# Patient Record
Sex: FEMALE | Race: BLACK OR AFRICAN AMERICAN | NOT HISPANIC OR LATINO | Employment: FULL TIME | URBAN - METROPOLITAN AREA
[De-identification: names, ages, dates, MRNs, and addresses within clinical notes are randomized per-mention and may not be internally consistent; named-entity substitution may affect disease eponyms.]

---

## 2023-05-31 DIAGNOSIS — I10 ESSENTIAL (PRIMARY) HYPERTENSION: ICD-10-CM

## 2023-05-31 RX ORDER — AMLODIPINE BESYLATE 5 MG/1
TABLET ORAL
Qty: 90 TABLET | Refills: 1 | Status: SHIPPED | OUTPATIENT
Start: 2023-05-31 | End: 2023-12-12

## 2023-06-12 DIAGNOSIS — E78.5 HYPERLIPIDEMIA, UNSPECIFIED: ICD-10-CM

## 2023-06-12 RX ORDER — ATORVASTATIN CALCIUM 40 MG/1
TABLET, FILM COATED ORAL
Qty: 90 TABLET | Refills: 1 | Status: SHIPPED | OUTPATIENT
Start: 2023-06-12 | End: 2023-10-16

## 2023-06-13 DIAGNOSIS — R92.8 ABNORMAL MAMMOGRAM: Primary | ICD-10-CM

## 2023-07-06 ENCOUNTER — HOSPITAL ENCOUNTER (OUTPATIENT)
Dept: DATA CONVERSION | Facility: HOSPITAL | Age: 53
End: 2023-07-06
Attending: SURGERY
Payer: COMMERCIAL

## 2023-07-06 DIAGNOSIS — R59.0 LOCALIZED ENLARGED LYMPH NODES: ICD-10-CM

## 2023-07-17 LAB
COMPLETE PATHOLOGY REPORT: NORMAL
CONVERTED CLINICAL DIAGNOSIS-HISTORY: NORMAL
CONVERTED FINAL DIAGNOSIS: NORMAL
CONVERTED FINAL REPORT PDF LINK TO COPY AND PASTE: NORMAL
CONVERTED GROSS DESCRIPTION: NORMAL

## 2023-07-24 LAB
ALANINE AMINOTRANSFERASE (SGPT) (U/L) IN SER/PLAS: 18 U/L (ref 7–45)
ALBUMIN (G/DL) IN SER/PLAS: 4.8 G/DL (ref 3.4–5)
ALKALINE PHOSPHATASE (U/L) IN SER/PLAS: 90 U/L (ref 33–110)
ANION GAP IN SER/PLAS: 13 MMOL/L (ref 10–20)
ASPARTATE AMINOTRANSFERASE (SGOT) (U/L) IN SER/PLAS: 17 U/L (ref 9–39)
BASOPHILS (10*3/UL) IN BLOOD BY AUTOMATED COUNT: 0.04 X10E9/L (ref 0–0.1)
BASOPHILS/100 LEUKOCYTES IN BLOOD BY AUTOMATED COUNT: 0.5 % (ref 0–2)
BILIRUBIN TOTAL (MG/DL) IN SER/PLAS: 0.4 MG/DL (ref 0–1.2)
CALCIUM (MG/DL) IN SER/PLAS: 10.3 MG/DL (ref 8.6–10.6)
CARBON DIOXIDE, TOTAL (MMOL/L) IN SER/PLAS: 28 MMOL/L (ref 21–32)
CHLORIDE (MMOL/L) IN SER/PLAS: 106 MMOL/L (ref 98–107)
CHOLESTEROL (MG/DL) IN SER/PLAS: 151 MG/DL (ref 0–199)
CHOLESTEROL IN HDL (MG/DL) IN SER/PLAS: 41.1 MG/DL
CHOLESTEROL/HDL RATIO: 3.7
CREATININE (MG/DL) IN SER/PLAS: 0.57 MG/DL (ref 0.5–1.05)
EOSINOPHILS (10*3/UL) IN BLOOD BY AUTOMATED COUNT: 0.22 X10E9/L (ref 0–0.7)
EOSINOPHILS/100 LEUKOCYTES IN BLOOD BY AUTOMATED COUNT: 2.9 % (ref 0–6)
ERYTHROCYTE DISTRIBUTION WIDTH (RATIO) BY AUTOMATED COUNT: 13.9 % (ref 11.5–14.5)
ERYTHROCYTE MEAN CORPUSCULAR HEMOGLOBIN CONCENTRATION (G/DL) BY AUTOMATED: 31.4 G/DL (ref 32–36)
ERYTHROCYTE MEAN CORPUSCULAR VOLUME (FL) BY AUTOMATED COUNT: 91 FL (ref 80–100)
ERYTHROCYTES (10*6/UL) IN BLOOD BY AUTOMATED COUNT: 4.46 X10E12/L (ref 4–5.2)
ESTIMATED AVERAGE GLUCOSE FOR HBA1C: 128 MG/DL
GFR FEMALE: >90 ML/MIN/1.73M2
GLUCOSE (MG/DL) IN SER/PLAS: 98 MG/DL (ref 74–99)
HEMATOCRIT (%) IN BLOOD BY AUTOMATED COUNT: 40.7 % (ref 36–46)
HEMOGLOBIN (G/DL) IN BLOOD: 12.8 G/DL (ref 12–16)
HEMOGLOBIN A1C/HEMOGLOBIN TOTAL IN BLOOD: 6.1 %
IMMATURE GRANULOCYTES/100 LEUKOCYTES IN BLOOD BY AUTOMATED COUNT: 0.5 % (ref 0–0.9)
LDL: 84 MG/DL (ref 0–99)
LEUKOCYTES (10*3/UL) IN BLOOD BY AUTOMATED COUNT: 7.7 X10E9/L (ref 4.4–11.3)
LYMPHOCYTES (10*3/UL) IN BLOOD BY AUTOMATED COUNT: 3.42 X10E9/L (ref 1.2–4.8)
LYMPHOCYTES/100 LEUKOCYTES IN BLOOD BY AUTOMATED COUNT: 44.4 % (ref 13–44)
MONOCYTES (10*3/UL) IN BLOOD BY AUTOMATED COUNT: 0.46 X10E9/L (ref 0.1–1)
MONOCYTES/100 LEUKOCYTES IN BLOOD BY AUTOMATED COUNT: 6 % (ref 2–10)
NEUTROPHILS (10*3/UL) IN BLOOD BY AUTOMATED COUNT: 3.52 X10E9/L (ref 1.2–7.7)
NEUTROPHILS/100 LEUKOCYTES IN BLOOD BY AUTOMATED COUNT: 45.7 % (ref 40–80)
NRBC (PER 100 WBCS) BY AUTOMATED COUNT: 0 /100 WBC (ref 0–0)
PLATELETS (10*3/UL) IN BLOOD AUTOMATED COUNT: 226 X10E9/L (ref 150–450)
POTASSIUM (MMOL/L) IN SER/PLAS: 4 MMOL/L (ref 3.5–5.3)
PROTEIN TOTAL: 7.3 G/DL (ref 6.4–8.2)
SODIUM (MMOL/L) IN SER/PLAS: 143 MMOL/L (ref 136–145)
THYROTROPIN (MIU/L) IN SER/PLAS BY DETECTION LIMIT <= 0.05 MIU/L: 0.82 MIU/L (ref 0.44–3.98)
TRIGLYCERIDE (MG/DL) IN SER/PLAS: 130 MG/DL (ref 0–149)
UREA NITROGEN (MG/DL) IN SER/PLAS: 14 MG/DL (ref 6–23)
VLDL: 26 MG/DL (ref 0–40)

## 2023-07-25 NOTE — RESULT ENCOUNTER NOTE
Appointment on 7/27/2023 we will discuss with patient at this visit.    A1c increased to 6.1 from 5.3 now in prediabetes range.    Other labs normal.

## 2023-07-26 PROBLEM — Z96.651 STATUS POST RIGHT KNEE REPLACEMENT: Status: ACTIVE | Noted: 2023-07-26

## 2023-07-26 PROBLEM — M16.10 PRIMARY LOCALIZED OSTEOARTHRITIS OF HIP: Status: ACTIVE | Noted: 2023-07-26

## 2023-07-26 PROBLEM — E78.5 HYPERLIPIDEMIA LDL GOAL <130: Status: ACTIVE | Noted: 2023-07-26

## 2023-07-26 PROBLEM — M17.0 OSTEOARTHRITIS OF KNEES, BILATERAL: Status: ACTIVE | Noted: 2023-07-26

## 2023-07-26 PROBLEM — R79.89 ABNORMAL TSH: Status: ACTIVE | Noted: 2023-07-26

## 2023-07-26 PROBLEM — R10.9 ABDOMINAL PAIN: Status: ACTIVE | Noted: 2023-07-26

## 2023-07-26 PROBLEM — G56.22 LESION OF LEFT ULNAR NERVE: Status: ACTIVE | Noted: 2023-07-26

## 2023-07-26 PROBLEM — R51.9 HEADACHE: Status: ACTIVE | Noted: 2023-07-26

## 2023-07-26 PROBLEM — M17.10 PRIMARY LOCALIZED OSTEOARTHRITIS OF KNEE: Status: ACTIVE | Noted: 2023-07-26

## 2023-07-26 PROBLEM — N39.0 URINARY TRACT INFECTION: Status: ACTIVE | Noted: 2023-07-26

## 2023-07-26 PROBLEM — M25.569 KNEE PAIN: Status: ACTIVE | Noted: 2023-07-26

## 2023-07-26 PROBLEM — I10 ESSENTIAL HYPERTENSION: Status: ACTIVE | Noted: 2023-07-26

## 2023-07-26 PROBLEM — E66.9 OBESITY: Status: ACTIVE | Noted: 2023-07-26

## 2023-07-26 PROBLEM — G56.02 CARPAL TUNNEL SYNDROME OF LEFT WRIST: Status: ACTIVE | Noted: 2023-07-26

## 2023-07-26 PROBLEM — R59.0 AXILLARY ADENOPATHY: Status: ACTIVE | Noted: 2023-07-26

## 2023-07-26 PROBLEM — R20.2 INTERMITTENT TINGLING SENSATION OF LEFT HAND AND FOOT: Status: ACTIVE | Noted: 2023-07-26

## 2023-07-26 PROBLEM — R92.8 ABNORMAL MAMMOGRAM: Status: ACTIVE | Noted: 2023-07-26

## 2023-07-26 RX ORDER — LANOLIN ALCOHOL/MO/W.PET/CERES
1 CREAM (GRAM) TOPICAL DAILY
COMMUNITY
End: 2023-12-05

## 2023-07-26 RX ORDER — TRAMADOL HYDROCHLORIDE 50 MG/1
1 TABLET ORAL EVERY 8 HOURS PRN
COMMUNITY
Start: 2023-05-30 | End: 2023-12-05

## 2023-07-26 RX ORDER — ASPIRIN 81 MG/1
1 TABLET ORAL 2 TIMES DAILY
COMMUNITY
Start: 2022-10-03 | End: 2023-12-05 | Stop reason: ALTCHOICE

## 2023-07-26 RX ORDER — ETODOLAC 500 MG/1
TABLET, EXTENDED RELEASE ORAL
COMMUNITY
Start: 2022-04-29 | End: 2023-12-05 | Stop reason: ALTCHOICE

## 2023-07-26 RX ORDER — DULAGLUTIDE 3 MG/.5ML
1 INJECTION, SOLUTION SUBCUTANEOUS
COMMUNITY
Start: 2022-12-16 | End: 2023-07-27 | Stop reason: SDUPTHER

## 2023-07-26 RX ORDER — ERGOCALCIFEROL 1.25 MG/1
CAPSULE ORAL
COMMUNITY
Start: 2017-07-20 | End: 2023-12-05

## 2023-07-27 ENCOUNTER — OFFICE VISIT (OUTPATIENT)
Dept: PRIMARY CARE | Facility: CLINIC | Age: 53
End: 2023-07-27
Payer: COMMERCIAL

## 2023-07-27 VITALS
BODY MASS INDEX: 39.16 KG/M2 | DIASTOLIC BLOOD PRESSURE: 86 MMHG | RESPIRATION RATE: 18 BRPM | HEART RATE: 78 BPM | SYSTOLIC BLOOD PRESSURE: 138 MMHG | WEIGHT: 221 LBS | TEMPERATURE: 97.6 F | OXYGEN SATURATION: 99 % | HEIGHT: 63 IN

## 2023-07-27 DIAGNOSIS — E78.5 HYPERLIPIDEMIA LDL GOAL <130: ICD-10-CM

## 2023-07-27 DIAGNOSIS — R00.2 PALPITATIONS: ICD-10-CM

## 2023-07-27 DIAGNOSIS — I10 ESSENTIAL (PRIMARY) HYPERTENSION: ICD-10-CM

## 2023-07-27 DIAGNOSIS — I10 ESSENTIAL HYPERTENSION: Primary | ICD-10-CM

## 2023-07-27 DIAGNOSIS — R73.03 PREDIABETES: ICD-10-CM

## 2023-07-27 PROBLEM — N95.1 HOT FLASHES DUE TO MENOPAUSE: Status: ACTIVE | Noted: 2023-07-27

## 2023-07-27 PROCEDURE — 3008F BODY MASS INDEX DOCD: CPT | Performed by: INTERNAL MEDICINE

## 2023-07-27 PROCEDURE — 93000 ELECTROCARDIOGRAM COMPLETE: CPT | Performed by: INTERNAL MEDICINE

## 2023-07-27 PROCEDURE — 99214 OFFICE O/P EST MOD 30 MIN: CPT | Performed by: INTERNAL MEDICINE

## 2023-07-27 PROCEDURE — 3075F SYST BP GE 130 - 139MM HG: CPT | Performed by: INTERNAL MEDICINE

## 2023-07-27 PROCEDURE — 3079F DIAST BP 80-89 MM HG: CPT | Performed by: INTERNAL MEDICINE

## 2023-07-27 RX ORDER — DULAGLUTIDE 3 MG/.5ML
1 INJECTION, SOLUTION SUBCUTANEOUS
Qty: 2 ML | Refills: 3 | Status: SHIPPED | OUTPATIENT
Start: 2023-07-27 | End: 2023-12-05

## 2023-07-27 ASSESSMENT — ENCOUNTER SYMPTOMS
APPETITE CHANGE: 0
WHEEZING: 0
DYSURIA: 0
SINUS PRESSURE: 0
WEAKNESS: 0
NUMBNESS: 0
DIFFICULTY URINATING: 0
RHINORRHEA: 0
ABDOMINAL DISTENTION: 0
FREQUENCY: 0
OCCASIONAL FEELINGS OF UNSTEADINESS: 0
FEVER: 0
COUGH: 0
ABDOMINAL PAIN: 0
BLOOD IN STOOL: 0
LOSS OF SENSATION IN FEET: 0
BACK PAIN: 0
NECK STIFFNESS: 0
DIZZINESS: 0
DIARRHEA: 0
SHORTNESS OF BREATH: 0
ARTHRALGIAS: 0
JOINT SWELLING: 0
NECK PAIN: 0
PALPITATIONS: 0
CONSTIPATION: 0
VOMITING: 0
MYALGIAS: 0
FATIGUE: 0
HEMATURIA: 0
DIAPHORESIS: 0
DEPRESSION: 0
NAUSEA: 0
SORE THROAT: 0
LIGHT-HEADEDNESS: 0
CHILLS: 0
HEADACHES: 0

## 2023-07-27 ASSESSMENT — PATIENT HEALTH QUESTIONNAIRE - PHQ9
SUM OF ALL RESPONSES TO PHQ9 QUESTIONS 1 AND 2: 0
2. FEELING DOWN, DEPRESSED OR HOPELESS: NOT AT ALL
1. LITTLE INTEREST OR PLEASURE IN DOING THINGS: NOT AT ALL

## 2023-07-27 NOTE — ASSESSMENT & PLAN NOTE
She has not had Trulicity in months because she was unable to receive them.  She is unsure if insurance stopped covering it.  Resume Trulicity for prediabetes and weight loss.  [facility] pharmacist contacted for Trulicity follow-up

## 2023-07-27 NOTE — ASSESSMENT & PLAN NOTE
O/E: CVS: S1 and S2 no additional heart sounds, no murmurs, no pedal edema. Resp: Air entry equal bilaterally, no crepitus or wheezes.  EKG normal sinus rhythm, no ST elevation, depression, T wave inversion, LVH.  We discussed doing Holter monitor.  Patient would like to hold off for now as symptoms are not persistent frequent or bothersome enough currently.  Patient will inform me if episodes become more frequent or she has associated symptoms such as chest pain, lightheadedness, syncopy, pedal edema.

## 2023-07-27 NOTE — ASSESSMENT & PLAN NOTE
-BP above target goal 130/80  -CMP, TSH ordered  -Continue amlodipine 5 mg daily   -Educated about adverse effects of uncontrolled blood pressure, importance of self blood pressure monitoring and bring to next visit.  -Counselled on weight loss low sodium diet, DASH diet and exercise

## 2023-07-27 NOTE — PROGRESS NOTES
"Subjective   Patient ID: Maryjaneradha Wallis is a 52 y.o. female who presents for Follow-up.    HPI   She states she has been under a lot of stress recently and has been feeling intermittent episodes of fluttering in her chest.  She usually has these episodes while she has heightened emotions for example  she had an episode during her god-daughters . Her mother recently had a massive heart attack. She states the episodes resolve within minutes after calming down. She denies associated chest pain, dyspnea at rest or on exertion, PND, orthopnea, pedal edema.     Review of Systems   Constitutional:  Negative for appetite change, chills, diaphoresis, fatigue and fever.   HENT:  Negative for congestion, ear discharge, ear pain, hearing loss, postnasal drip, rhinorrhea, sinus pressure, sore throat and tinnitus.    Eyes:  Negative for visual disturbance.   Respiratory:  Negative for cough, shortness of breath and wheezing.    Cardiovascular:  Negative for chest pain, palpitations and leg swelling.   Gastrointestinal:  Negative for abdominal distention, abdominal pain, blood in stool, constipation, diarrhea, nausea and vomiting.   Genitourinary:  Negative for decreased urine volume, difficulty urinating, dysuria, frequency, hematuria and urgency.   Musculoskeletal:  Negative for arthralgias, back pain, gait problem, joint swelling, myalgias, neck pain and neck stiffness.   Skin:  Negative for rash.   Neurological:  Negative for dizziness, weakness, light-headedness, numbness and headaches.       Objective   /86 (BP Location: Left arm, Patient Position: Sitting, BP Cuff Size: Large adult)   Pulse 78   Temp 36.4 °C (97.6 °F) (Oral)   Resp 18   Ht 1.6 m (5' 3\")   Wt 100 kg (221 lb)   SpO2 99%   BMI 39.15 kg/m²     Physical Exam  Vitals reviewed.   Constitutional:       Appearance: Normal appearance. She is normal weight.   HENT:      Right Ear: Tympanic membrane and external ear normal.      Left Ear: Tympanic " membrane and external ear normal.   Eyes:      Extraocular Movements: Extraocular movements intact.      Conjunctiva/sclera: Conjunctivae normal.      Pupils: Pupils are equal, round, and reactive to light.   Cardiovascular:      Rate and Rhythm: Normal rate and regular rhythm.      Pulses: Normal pulses.   Pulmonary:      Effort: Pulmonary effort is normal.      Breath sounds: Normal breath sounds.   Abdominal:      General: Bowel sounds are normal.      Palpations: Abdomen is soft.   Musculoskeletal:         General: Normal range of motion.      Cervical back: Normal range of motion.   Skin:     General: Skin is warm and dry.   Neurological:      General: No focal deficit present.      Mental Status: She is oriented to person, place, and time.   Psychiatric:         Mood and Affect: Mood normal.         Behavior: Behavior normal.         Assessment/Plan   Problem List Items Addressed This Visit       Essential hypertension - Primary     -BP above target goal 130/80  -CMP, TSH ordered  -Continue amlodipine 5 mg daily   -Educated about adverse effects of uncontrolled blood pressure, importance of self blood pressure monitoring and bring to next visit.  -Counselled on weight loss low sodium diet, DASH diet and exercise            Hyperlipidemia LDL goal <130     Continue atorvastatin 40 mg daily          Palpitations     O/E: CVS: S1 and S2 no additional heart sounds, no murmurs, no pedal edema. Resp: Air entry equal bilaterally, no crepitus or wheezes.  EKG normal sinus rhythm, no ST elevation, depression, T wave inversion, LVH.  We discussed doing Holter monitor.  Patient would like to hold off for now as symptoms are not persistent frequent or bothersome enough currently.  Patient will inform me if episodes become more frequent or she has associated symptoms such as chest pain, lightheadedness, syncopy, pedal edema.           Relevant Orders    ECG 12 lead (Clinic Performed)    Prediabetes     She has not had  Trulicity in months because she was unable to receive them.  She is unsure if insurance stopped covering it.  Resume Trulicity for prediabetes and weight loss.  [facility] pharmacist contacted for Trulicity follow-up         Relevant Medications    dulaglutide (Trulicity) 3 mg/0.5 mL pen injector     Other Visit Diagnoses       Essential (primary) hypertension            I discussed recent labs with patient during visit.    RTC in 4 mo, sooner if needed.

## 2023-07-31 PROBLEM — E55.9 VITAMIN D DEFICIENCY: Status: ACTIVE | Noted: 2023-07-31

## 2023-08-28 DIAGNOSIS — R73.03 PREDIABETES: Primary | ICD-10-CM

## 2023-10-06 PROBLEM — E66.01 MORBID OBESITY WITH BMI OF 40.0-44.9, ADULT (MULTI): Status: ACTIVE | Noted: 2023-10-06

## 2023-10-06 PROBLEM — M16.12 PRIMARY OSTEOARTHRITIS OF LEFT HIP: Status: ACTIVE | Noted: 2023-10-06

## 2023-10-06 PROBLEM — M17.11 PRIMARY LOCALIZED OSTEOARTHRITIS OF RIGHT KNEE: Status: ACTIVE | Noted: 2023-10-06

## 2023-10-06 PROBLEM — E78.5 HYPERLIPIDEMIA: Status: ACTIVE | Noted: 2023-10-06

## 2023-10-06 PROBLEM — Z79.82 LONG TERM (CURRENT) USE OF ASPIRIN: Status: ACTIVE | Noted: 2022-10-06

## 2023-10-06 PROBLEM — Z96.651 PRESENCE OF RIGHT ARTIFICIAL KNEE JOINT: Status: ACTIVE | Noted: 2022-09-23

## 2023-10-06 RX ORDER — DEXTROMETHORPHAN HYDROBROMIDE, GUAIFENESIN 5; 100 MG/5ML; MG/5ML
2 LIQUID ORAL EVERY 8 HOURS PRN
COMMUNITY

## 2023-10-06 RX ORDER — OXYCODONE HYDROCHLORIDE 5 MG/1
1 TABLET ORAL EVERY 6 HOURS
COMMUNITY
Start: 2022-10-05 | End: 2023-12-05 | Stop reason: ALTCHOICE

## 2023-10-06 RX ORDER — CELECOXIB 200 MG/1
1 CAPSULE ORAL DAILY
COMMUNITY
Start: 2021-10-21 | End: 2023-12-05 | Stop reason: ALTCHOICE

## 2023-10-09 ENCOUNTER — OFFICE VISIT (OUTPATIENT)
Dept: ORTHOPEDIC SURGERY | Facility: CLINIC | Age: 53
End: 2023-10-09
Payer: COMMERCIAL

## 2023-10-09 ENCOUNTER — ANCILLARY PROCEDURE (OUTPATIENT)
Dept: RADIOLOGY | Facility: CLINIC | Age: 53
End: 2023-10-09
Payer: COMMERCIAL

## 2023-10-09 DIAGNOSIS — Z96.651 STATUS POST RIGHT KNEE REPLACEMENT: Primary | ICD-10-CM

## 2023-10-09 DIAGNOSIS — M16.12 PRIMARY OSTEOARTHRITIS OF LEFT HIP: ICD-10-CM

## 2023-10-09 DIAGNOSIS — Z96.651 STATUS POST RIGHT KNEE REPLACEMENT: ICD-10-CM

## 2023-10-09 PROCEDURE — 99214 OFFICE O/P EST MOD 30 MIN: CPT | Performed by: ORTHOPAEDIC SURGERY

## 2023-10-09 PROCEDURE — 73562 X-RAY EXAM OF KNEE 3: CPT | Mod: RT,FY

## 2023-10-09 PROCEDURE — 73562 X-RAY EXAM OF KNEE 3: CPT | Mod: RIGHT SIDE | Performed by: STUDENT IN AN ORGANIZED HEALTH CARE EDUCATION/TRAINING PROGRAM

## 2023-10-09 NOTE — PROGRESS NOTES
Returns for right knee.  Very doing very well.  Happy with her progress.  Would like to go ahead with left hip replacement later this year.  Pain with daily activity.  Had cortisone injection in July.    On exam: Right knee: Full range of motion.  Full extension.  Flexes past 120.  No warmth or swelling.  Limited rotation left hip with severe pain on internal rotation.    I personally reviewed the following radiographic exams: Right knee shows well fixed well aligned total knee.  X-ray left hip shows end-stage arthrosis left hip.  Moderate arthrosis right hip.    Assessment: Status post right total knee.  Left hip arthrosis.    Plan: Discussed nonoperative and operative options in detail.   Risk and benefits discussed in detail. All questions answered today.  Recovery timeline and expectations discussed in detail.  No restrictions in terms of right knee.  Discussed antibiotic prophylaxis for her knee going forward.  Discussed hip replacement.  Discussed difference of risk with knee versus hip.  Discussed dislocation in detail.  Discussed postop recovery.  She would like to go ahead later this year.  Left total Hip Replacement 09164 with Depuy Actis implant. Anesthesia consult. Antibiotics 2 gm Kefzol. 1 gm TXA. 75 minutes   Crutches or walker  POD 0

## 2023-11-29 ENCOUNTER — APPOINTMENT (OUTPATIENT)
Dept: PRIMARY CARE | Facility: CLINIC | Age: 53
End: 2023-11-29
Payer: COMMERCIAL

## 2023-12-05 ENCOUNTER — TELEMEDICINE CLINICAL SUPPORT (OUTPATIENT)
Dept: PREADMISSION TESTING | Facility: HOSPITAL | Age: 53
End: 2023-12-05
Payer: COMMERCIAL

## 2023-12-05 ENCOUNTER — TELEPHONE (OUTPATIENT)
Dept: PREADMISSION TESTING | Facility: HOSPITAL | Age: 53
End: 2023-12-05
Payer: COMMERCIAL

## 2023-12-07 NOTE — PROGRESS NOTES
12/7/23 [id number]  Call placed to patient to discuss discharge planning for after surgery.  Message left.  Malgorzata Peguero RN [facility]

## 2023-12-08 ENCOUNTER — PRE-ADMISSION TESTING (OUTPATIENT)
Dept: PREADMISSION TESTING | Facility: HOSPITAL | Age: 53
End: 2023-12-08
Payer: COMMERCIAL

## 2023-12-08 ENCOUNTER — LAB (OUTPATIENT)
Dept: LAB | Facility: LAB | Age: 53
End: 2023-12-08
Payer: COMMERCIAL

## 2023-12-08 ENCOUNTER — HOSPITAL ENCOUNTER (OUTPATIENT)
Dept: RADIOLOGY | Facility: HOSPITAL | Age: 53
Discharge: HOME | End: 2023-12-08
Payer: COMMERCIAL

## 2023-12-08 ENCOUNTER — TELEPHONE (OUTPATIENT)
Dept: PHARMACY | Facility: HOSPITAL | Age: 53
End: 2023-12-08
Payer: COMMERCIAL

## 2023-12-08 VITALS
HEIGHT: 64 IN | BODY MASS INDEX: 38.5 KG/M2 | WEIGHT: 225.53 LBS | HEART RATE: 69 BPM | OXYGEN SATURATION: 100 % | DIASTOLIC BLOOD PRESSURE: 75 MMHG | RESPIRATION RATE: 18 BRPM | TEMPERATURE: 95.7 F | SYSTOLIC BLOOD PRESSURE: 134 MMHG

## 2023-12-08 DIAGNOSIS — M25.552 LEFT HIP PAIN: ICD-10-CM

## 2023-12-08 DIAGNOSIS — Z01.818 PREOP TESTING: ICD-10-CM

## 2023-12-08 DIAGNOSIS — Z01.818 PREOP TESTING: Primary | ICD-10-CM

## 2023-12-08 DIAGNOSIS — M25.552 LEFT HIP PAIN: Primary | ICD-10-CM

## 2023-12-08 LAB
ANION GAP SERPL CALC-SCNC: 15 MMOL/L (ref 10–20)
ATRIAL RATE: 68 BPM
BASOPHILS # BLD AUTO: 0.03 X10*3/UL (ref 0–0.1)
BASOPHILS NFR BLD AUTO: 0.4 %
BUN SERPL-MCNC: 8 MG/DL (ref 6–23)
CALCIUM SERPL-MCNC: 9.6 MG/DL (ref 8.6–10.3)
CHLORIDE SERPL-SCNC: 100 MMOL/L (ref 98–107)
CO2 SERPL-SCNC: 25 MMOL/L (ref 21–32)
CREAT SERPL-MCNC: 0.56 MG/DL (ref 0.5–1.05)
EOSINOPHIL # BLD AUTO: 0.16 X10*3/UL (ref 0–0.7)
EOSINOPHIL NFR BLD AUTO: 2.1 %
ERYTHROCYTE [DISTWIDTH] IN BLOOD BY AUTOMATED COUNT: 14 % (ref 11.5–14.5)
GFR SERPL CREATININE-BSD FRML MDRD: >90 ML/MIN/1.73M*2
GLUCOSE SERPL-MCNC: 90 MG/DL (ref 74–99)
HCT VFR BLD AUTO: 38.9 % (ref 36–46)
HGB BLD-MCNC: 12.8 G/DL (ref 12–16)
IMM GRANULOCYTES # BLD AUTO: 0.06 X10*3/UL (ref 0–0.7)
IMM GRANULOCYTES NFR BLD AUTO: 0.8 % (ref 0–0.9)
LYMPHOCYTES # BLD AUTO: 3.16 X10*3/UL (ref 1.2–4.8)
LYMPHOCYTES NFR BLD AUTO: 41.6 %
MCH RBC QN AUTO: 28.6 PG (ref 26–34)
MCHC RBC AUTO-ENTMCNC: 32.9 G/DL (ref 32–36)
MCV RBC AUTO: 87 FL (ref 80–100)
MONOCYTES # BLD AUTO: 0.49 X10*3/UL (ref 0.1–1)
MONOCYTES NFR BLD AUTO: 6.4 %
NEUTROPHILS # BLD AUTO: 3.7 X10*3/UL (ref 1.2–7.7)
NEUTROPHILS NFR BLD AUTO: 48.7 %
NRBC BLD-RTO: 0 /100 WBCS (ref 0–0)
P AXIS: 43 DEGREES
P OFFSET: 194 MS
P ONSET: 138 MS
PLATELET # BLD AUTO: 118 X10*3/UL (ref 150–450)
POTASSIUM SERPL-SCNC: 3.9 MMOL/L (ref 3.5–5.3)
PR INTERVAL: 174 MS
Q ONSET: 225 MS
QRS COUNT: 11 BEATS
QRS DURATION: 98 MS
QT INTERVAL: 416 MS
QTC CALCULATION(BAZETT): 442 MS
QTC FREDERICIA: 434 MS
R AXIS: -4 DEGREES
RBC # BLD AUTO: 4.47 X10*6/UL (ref 4–5.2)
SODIUM SERPL-SCNC: 136 MMOL/L (ref 136–145)
T AXIS: 22 DEGREES
T OFFSET: 433 MS
VENTRICULAR RATE: 68 BPM
WBC # BLD AUTO: 7.6 X10*3/UL (ref 4.4–11.3)

## 2023-12-08 PROCEDURE — 99214 OFFICE O/P EST MOD 30 MIN: CPT | Performed by: PHYSICIAN ASSISTANT

## 2023-12-08 PROCEDURE — 80048 BASIC METABOLIC PNL TOTAL CA: CPT

## 2023-12-08 PROCEDURE — 87081 CULTURE SCREEN ONLY: CPT | Mod: AHULAB | Performed by: PHYSICIAN ASSISTANT

## 2023-12-08 PROCEDURE — 36415 COLL VENOUS BLD VENIPUNCTURE: CPT

## 2023-12-08 PROCEDURE — 73502 X-RAY EXAM HIP UNI 2-3 VIEWS: CPT | Mod: LT,FY

## 2023-12-08 PROCEDURE — 85025 COMPLETE CBC W/AUTO DIFF WBC: CPT

## 2023-12-08 PROCEDURE — 93005 ELECTROCARDIOGRAM TRACING: CPT | Performed by: PHYSICIAN ASSISTANT

## 2023-12-08 RX ORDER — CHLORHEXIDINE GLUCONATE ORAL RINSE 1.2 MG/ML
SOLUTION DENTAL
Qty: 475 ML | Refills: 0 | Status: SHIPPED | OUTPATIENT
Start: 2023-12-08 | End: 2023-12-12 | Stop reason: HOSPADM

## 2023-12-08 ASSESSMENT — ENCOUNTER SYMPTOMS
BRUISES/BLEEDS EASILY: 0
DYSURIA: 0
SINUS CONGESTION: 0
RHINORRHEA: 0
DIARRHEA: 0
NAUSEA: 0
TROUBLE SWALLOWING: 0
LIGHT-HEADEDNESS: 0
VISUAL CHANGE: 0
WOUND: 0
VOMITING: 0
CONSTIPATION: 1
CHILLS: 0
SHORTNESS OF BREATH: 0
ABDOMINAL DISTENTION: 0
ABDOMINAL PAIN: 0
NECK PAIN: 0
DIFFICULTY URINATING: 0
DYSPNEA AT REST: 0
BLOOD IN STOOL: 0
MYALGIAS: 0
NUMBNESS: 0
FEVER: 0
NECK STIFFNESS: 0
EYE DISCHARGE: 0
HEMOPTYSIS: 0
DYSPNEA WITH EXERTION: 0
UNEXPECTED WEIGHT CHANGE: 0
EYE PAIN: 0
EXCESSIVE BLEEDING: 0
COUGH: 0
WHEEZING: 0
DOUBLE VISION: 0
ARTHRALGIAS: 1
CONFUSION: 0
PALPITATIONS: 0
LIMITED RANGE OF MOTION: 1
SKIN CHANGES: 0
WEAKNESS: 0

## 2023-12-08 NOTE — CPM/PAT H&P
[facility]/PAT Evaluation       Name: Maryjane Wallis (Maryjane Wallis)  /Age: 1970/53 y.o.         Date of Consult: 23    Referring Provider: Dr. William    Surgery, Date, and Length: Left Hip Total Arthroplasty ~ Posterior Approach - Left , 23, 120MIN    Maryjane Wallis is a 53 year-old female who presents to the [facility] for perioperative risk assessment prior to surgery.    Patient presents with a primary diagnosis of left hip osteoarthritis. Pt refers to left hip pain x 2 years; worse over the past 12 months. She has tried steroid joint injections which most recently provided about 1 month of pain relief. She takes tylenol as needed for pain with minimal results.  She is having a difficult time sleeping. Sitting makes pain worse.  She has decreased ROM in left hip and difficult getting shoes and sock on and off. Pt wishes to proceed with TJR as planned.     This note was created in part upon personal review of patient's medical records.      Patient is scheduled to have Left Hip Total Arthroplasty ~ Posterior Approach - Left       Pt denies any past history of anesthetic complications such as PONV, awareness, prolonged sedation, dental damage, aspiration, cardiac arrest, difficult intubation, difficult I.V. access or unexpected hospital admissions.  NO malignant hyperthermia and or pseudocholinesterase deficiency.  + history of blood transfusions  - at time of ITP in     The patient is not a [employer] and will accept blood and blood products if medically indicated.   Type and screen NOT sent.     Past Medical History:   Diagnosis Date    Acute idiopathic thrombocytopenic purpura (CMS/HCC)     Carpal tunnel syndrome of left wrist     Hyperlipidemia     Hypertension     OA (osteoarthritis)     bilateral knees    Obesity     Prediabetes        Past Surgical History:   Procedure Laterality Date    CARPAL TUNNEL RELEASE Bilateral     Neuroplasty Decompression Median Nerve At Carpal Tunnel     HYSTERECTOMY  2013    TOTAL KNEE ARTHROPLASTY Right 2022    TUBAL LIGATION  2004       Patient Sexual activity questions deferred to the physician.    Family History   Problem Relation Name Age of Onset    Coronary artery disease Mother      Stroke Mother      Heart attack Mother      Hypertension Father      Thyroid disease Sister       Social History     Socioeconomic History    Marital status:      Spouse name: Not on file    Number of children: Not on file    Years of education: Not on file    Highest education level: Not on file   Occupational History    Not on file   Tobacco Use    Smoking status: Never    Smokeless tobacco: Never   Vaping Use    Vaping Use: Never used   Substance and Sexual Activity    Alcohol use: Yes     Alcohol/week: 1.0 standard drink of alcohol     Types: 1 Standard drinks or equivalent per week    Drug use: Never    Sexual activity: Defer   Other Topics Concern    Not on file   Social History Narrative    Not on file     Social Determinants of Health     Financial Resource Strain: Not on file   Food Insecurity: Not on file   Transportation Needs: Not on file   Physical Activity: Not on file   Stress: Not on file   Social Connections: Not on file   Intimate Partner Violence: Not on file   Housing Stability: Not on file        No Known Allergies    Prior to Admission medications    Medication Sig Start Date End Date Taking? Authorizing Provider   acetaminophen (Tylenol 8 HOUR) 650 mg ER tablet Take 2 tablets (1,300 mg) by mouth every 8 hours if needed.    Historical Provider, MD   amLODIPine (Norvasc) 5 mg tablet TAKE 1 TABLET BY MOUTH EVERY DAY 5/31/23   Christopher D'Amico, DO   atorvastatin (Lipitor) 40 mg tablet TAKE 1 TABLET BY MOUTH EVERY DAY 10/16/23   Jean Paul Jeffers MD   chlorhexidine (Peridex) 0.12 % solution Swish for 30 seconds and spit 15mL of solution the night before and morning of surgery 12/8/23   DOROTHY OzunaC   DIETARY SUPPLEMENT ORAL Take 1 Dose by  mouth once daily at bedtime. PROBIOTIC    Historical Provider, MD   multivit,calc,mins/iron/folic (WOMEN'S ONE DAILY ORAL) Take by mouth. TABS    Historical Provider, MD   aspirin 81 mg EC tablet Take 1 tablet (81 mg) by mouth 2 times a day. 10/3/22 12/5/23  Historical Provider, MD   celecoxib (CeleBREX) 200 mg capsule Take 1 capsule (200 mg) by mouth once daily. 10/21/21 12/5/23  Historical Provider, MD   dulaglutide (Trulicity) 3 mg/0.5 mL pen injector Inject 1 Pen under the skin 1 (one) time per week. 7/27/23 12/5/23  Jean Paul Jeffers MD   ergocalciferol (Vitamin D-2) 1.25 MG (60687 UT) capsule Take by mouth. 7/20/17 12/5/23  Historical Provider, MD   etodolac XL (Lodine XL) 500 mg 24 hr tablet Take by mouth once daily. 4/29/22 12/5/23  Historical Provider, MD   magnesium oxide (MagOx) 400 mg (241.3 mg magnesium) tablet Take 1 tablet (400 mg) by mouth once daily.  12/5/23  Historical Provider, MD   multivitamin capsule Take by mouth.  12/5/23  Historical Provider, MD   oxyCODONE (Roxicodone) 5 mg immediate release tablet Take 1 tablet (5 mg) by mouth every 6 hours. 10/5/22 12/5/23  Historical Provider, MD   traMADol (Ultram) 50 mg tablet Take 1 tablet (50 mg) by mouth every 8 hours if needed. 5/30/23 12/5/23  Historical Provider, MD        PAT ROS:   Constitutional:    no fever   no chills   no unexpected weight change  Neuro/Psych:    no numbness   no weakness   no light-headedness   no confusion  Eyes:    no discharge   no pain   no vision loss   no diplopia   no visual disturbance  Ears:    no ear pain   no hearing loss   no tinnitus  Nose:    no nasal discharge   no sinus congestion   no epistaxis  Mouth:    no dental issues   no mouth pain   no oral bleeding   no mouth lesions  Throat:    no throat pain   no dysphagia  Neck:    no neck pain   no neck stiffness  Cardio:    Functional 4 Mets. Patient denies SOB walking up 2 flights of stairs   Limited with left hip pain; cooking, cleaning, grocery shopping  (push cart)   no chest pain   no palpitations   no peripheral edema   no dyspnea   no HATHAWAY  Respiratory:    no cough   no wheezing   no hemoptysis   no shortness of breath  Endocrine:    no cold intolerance   no heat intolerance  GI:    no abdominal distention   no abdominal pain   constipation   no diarrhea   no nausea   no vomiting   no blood in stool  :    no difficulty urinating   no dysuria   no oliguria  Musculoskeletal:    arthralgias (left hip)   no myalgias   decreased ROM (left hip)  Hematologic:    does not bruise/bleed easily   no excessive bleeding   history of blood transfusion   no blood clots  Skin:   no skin changes   no sores/wound   no rash      Physical Exam  Constitutional:       General: She is not in acute distress.     Appearance: Normal appearance. She is not ill-appearing, toxic-appearing or diaphoretic.   HENT:      Head: Normocephalic and atraumatic.      Nose: Nose normal. No congestion or rhinorrhea.      Mouth/Throat:      Mouth: Mucous membranes are moist.      Pharynx: No posterior oropharyngeal erythema.   Eyes:      Extraocular Movements: Extraocular movements intact.      Conjunctiva/sclera: Conjunctivae normal.   Cardiovascular:      Rate and Rhythm: Normal rate and regular rhythm.      Pulses: Normal pulses.      Heart sounds: Normal heart sounds. No murmur heard.     No friction rub. No gallop.   Pulmonary:      Effort: Pulmonary effort is normal. No respiratory distress.      Breath sounds: Normal breath sounds. No stridor. No wheezing or rhonchi.   Abdominal:      General: Bowel sounds are normal. There is no distension.      Palpations: Abdomen is soft. There is no mass.      Tenderness: There is no abdominal tenderness. There is no guarding or rebound.      Hernia: No hernia is present.   Musculoskeletal:         General: Tenderness (left hip; decreased ROM; pain with ext rotation of left hip) present. No swelling, deformity or signs of injury.      Cervical back: Normal  "range of motion and neck supple. No rigidity or tenderness.   Skin:     General: Skin is warm and dry.      Coloration: Skin is not jaundiced or pale.      Findings: No bruising, erythema, lesion or rash.   Neurological:      General: No focal deficit present.      Mental Status: She is alert and oriented to person, place, and time.      Cranial Nerves: No cranial nerve deficit.      Sensory: No sensory deficit.   Psychiatric:         Mood and Affect: Mood normal.         Behavior: Behavior normal.          PAT AIRWAY:   Airway:     Mallampati::  II    Neck ROM::  Full   No broken teeth, no dentures and no missing teeth          Visit Vitals  /75   Pulse 69   Temp 35.4 °C (95.7 °F)   Resp 18   Ht 1.626 m (5' 4\")   Wt 102 kg (225 lb 8.5 oz)   SpO2 100%   BMI 38.71 kg/m²   Smoking Status Never   BSA 2.15 m²           DASI Risk Score    No data to display       Caprini DVT Assessment    No data to display       Modified Frailty Index    No data to display       CHADS2 Stroke Risk  Current as of 8 minutes ago        N/A 3 - 100%: High Risk   2 - 3%: Medium Risk   0 - 2%: Low Risk     Last Change: N/A          This score determines the patient's risk of having a stroke if the patient has atrial fibrillation.        This score is not applicable to this patient. Components are not calculated.          Revised Cardiac Risk Index    No data to display       Apfel Simplified Score    No data to display       Risk Analysis Index Results This Encounter    No data found in the last 1 encounters.       LABS:  Lab Results   Component Value Date    WBC 7.6 12/08/2023    HGB 12.8 12/08/2023    HCT 38.9 12/08/2023    MCV 87 12/08/2023     (L) 12/08/2023      Lab Results   Component Value Date    GLUCOSE 90 12/08/2023    CALCIUM 9.6 12/08/2023     12/08/2023    K 3.9 12/08/2023    CO2 25 12/08/2023     12/08/2023    BUN 8 12/08/2023    CREATININE 0.56 12/08/2023      EKG 12/8/23  NSR  Incomplete " RBBB  Nonspecific T wave abnormality  Abnormal EKG  Vent rate = 68 bpm    EKG 9/27/22  Normal sinus rhythm  Incomplete right bundle branch block  Borderline ECG    Assessment and Plan:       Patient is a -53year-old female scheduled for a Left Hip Total Arthroplasty ~ Posterior Approach - Left  with Dr. William on 12/12/23.    Patient has no active cardiac symptoms.   Patient denies any chest pain, tightness, heaviness, pressure, radiating pain, palpitations, irregular heartbeats, lightheadedness, cough, congestion, shortness of breath, HATHAWAY, PND, near syncope, weight loss or gain.     RCRI  1 (type of surgery)  , 6 % Risk of MACE    Cardiac:  HTN - cont amlodipine on dos   Encouraged lifestyle modifications, low-sodium diet, and increase activity as tolerated.  Monitor BP and follow up with managing physician for readings sustaining >140/90.    HLD - cont statin on dos     Hematology:  ITP - 2007 - one time occurrence  12/8/23 PLT = 118    Patient instructed to ambulate as soon as possible postoperatively to decrease thromboembolic risk.   Initiate mechanical DVT prophylaxis as soon as possible and initiate chemical prophylaxis when deemed safe from a bleeding standpoint post surgery.     LABS: CBC, BMP, MRSA and EKG ordered. Lab results reviewed and show thrombocytopenia of 118; no additional testing required.    Followup: MRSA pending    STOP BANG: obese, >50 = 2    Caprini: 7    Risk assessment complete.  Patient is scheduled for a intermediate surgical risk procedure.        Preoperative medication instructions were provided and reviewed with the patient.  Any additional testing or evaluation was explained to the patient.  Nothing by mouth instructions were discussed and patient's questions were answered prior to conclusion to this encounter.  Patient verbalized understanding of preoperative instructions given in preadmission testing; discharge instructions available in EMR.    This note was dictated by a  speech recognition.  Minor errors may have been detected in a speech recognition.

## 2023-12-08 NOTE — TELEPHONE ENCOUNTER
Prior Authorization Request, voicemail left for patient    Medication: trulicity   Quantity: 2 mL for 28 days  PA response:Denied  Denial Reason: Your plan only covers this drug when it is used for certain health conditions. Covered use is for type 2 diabetes mellitus.    Signed,  Ailin Oliver

## 2023-12-08 NOTE — H&P (VIEW-ONLY)
[facility]/PAT Evaluation       Name: Maryjane Wallis (Maryjane Wallis)  /Age: 1970/53 y.o.         Date of Consult: 23    Referring Provider: Dr. William    Surgery, Date, and Length: Left Hip Total Arthroplasty ~ Posterior Approach - Left , 23, 120MIN    Maryjane Wallis is a 53 year-old female who presents to the [facility] for perioperative risk assessment prior to surgery.    Patient presents with a primary diagnosis of left hip osteoarthritis. Pt refers to left hip pain x 2 years; worse over the past 12 months. She has tried steroid joint injections which most recently provided about 1 month of pain relief. She takes tylenol as needed for pain with minimal results.  She is having a difficult time sleeping. Sitting makes pain worse.  She has decreased ROM in left hip and difficult getting shoes and sock on and off. Pt wishes to proceed with TJR as planned.     This note was created in part upon personal review of patient's medical records.      Patient is scheduled to have Left Hip Total Arthroplasty ~ Posterior Approach - Left       Pt denies any past history of anesthetic complications such as PONV, awareness, prolonged sedation, dental damage, aspiration, cardiac arrest, difficult intubation, difficult I.V. access or unexpected hospital admissions.  NO malignant hyperthermia and or pseudocholinesterase deficiency.  + history of blood transfusions  - at time of ITP in     The patient is not a Jain and will accept blood and blood products if medically indicated.   Type and screen NOT sent.     Past Medical History:   Diagnosis Date    Acute idiopathic thrombocytopenic purpura (CMS/HCC)     Carpal tunnel syndrome of left wrist     Hyperlipidemia     Hypertension     OA (osteoarthritis)     bilateral knees    Obesity     Prediabetes        Past Surgical History:   Procedure Laterality Date    CARPAL TUNNEL RELEASE Bilateral     Neuroplasty Decompression Median Nerve At Carpal Tunnel     HYSTERECTOMY  2013    TOTAL KNEE ARTHROPLASTY Right 2022    TUBAL LIGATION  2004       Patient Sexual activity questions deferred to the physician.    Family History   Problem Relation Name Age of Onset    Coronary artery disease Mother      Stroke Mother      Heart attack Mother      Hypertension Father      Thyroid disease Sister       Social History     Socioeconomic History    Marital status:      Spouse name: Not on file    Number of children: Not on file    Years of education: Not on file    Highest education level: Not on file   Occupational History    Not on file   Tobacco Use    Smoking status: Never    Smokeless tobacco: Never   Vaping Use    Vaping Use: Never used   Substance and Sexual Activity    Alcohol use: Yes     Alcohol/week: 1.0 standard drink of alcohol     Types: 1 Standard drinks or equivalent per week    Drug use: Never    Sexual activity: Defer   Other Topics Concern    Not on file   Social History Narrative    Not on file     Social Determinants of Health     Financial Resource Strain: Not on file   Food Insecurity: Not on file   Transportation Needs: Not on file   Physical Activity: Not on file   Stress: Not on file   Social Connections: Not on file   Intimate Partner Violence: Not on file   Housing Stability: Not on file        No Known Allergies    Prior to Admission medications    Medication Sig Start Date End Date Taking? Authorizing Provider   acetaminophen (Tylenol 8 HOUR) 650 mg ER tablet Take 2 tablets (1,300 mg) by mouth every 8 hours if needed.    Historical Provider, MD   amLODIPine (Norvasc) 5 mg tablet TAKE 1 TABLET BY MOUTH EVERY DAY 5/31/23   Christopher D'Amico, DO   atorvastatin (Lipitor) 40 mg tablet TAKE 1 TABLET BY MOUTH EVERY DAY 10/16/23   Jean Paul Jeffers MD   chlorhexidine (Peridex) 0.12 % solution Swish for 30 seconds and spit 15mL of solution the night before and morning of surgery 12/8/23   DOROTHY OzunaC   DIETARY SUPPLEMENT ORAL Take 1 Dose by  mouth once daily at bedtime. PROBIOTIC    Historical Provider, MD   multivit,calc,mins/iron/folic (WOMEN'S ONE DAILY ORAL) Take by mouth. TABS    Historical Provider, MD   aspirin 81 mg EC tablet Take 1 tablet (81 mg) by mouth 2 times a day. 10/3/22 12/5/23  Historical Provider, MD   celecoxib (CeleBREX) 200 mg capsule Take 1 capsule (200 mg) by mouth once daily. 10/21/21 12/5/23  Historical Provider, MD   dulaglutide (Trulicity) 3 mg/0.5 mL pen injector Inject 1 Pen under the skin 1 (one) time per week. 7/27/23 12/5/23  Jean Paul Jeffers MD   ergocalciferol (Vitamin D-2) 1.25 MG (52621 UT) capsule Take by mouth. 7/20/17 12/5/23  Historical Provider, MD   etodolac XL (Lodine XL) 500 mg 24 hr tablet Take by mouth once daily. 4/29/22 12/5/23  Historical Provider, MD   magnesium oxide (MagOx) 400 mg (241.3 mg magnesium) tablet Take 1 tablet (400 mg) by mouth once daily.  12/5/23  Historical Provider, MD   multivitamin capsule Take by mouth.  12/5/23  Historical Provider, MD   oxyCODONE (Roxicodone) 5 mg immediate release tablet Take 1 tablet (5 mg) by mouth every 6 hours. 10/5/22 12/5/23  Historical Provider, MD   traMADol (Ultram) 50 mg tablet Take 1 tablet (50 mg) by mouth every 8 hours if needed. 5/30/23 12/5/23  Historical Provider, MD        PAT ROS:   Constitutional:    no fever   no chills   no unexpected weight change  Neuro/Psych:    no numbness   no weakness   no light-headedness   no confusion  Eyes:    no discharge   no pain   no vision loss   no diplopia   no visual disturbance  Ears:    no ear pain   no hearing loss   no tinnitus  Nose:    no nasal discharge   no sinus congestion   no epistaxis  Mouth:    no dental issues   no mouth pain   no oral bleeding   no mouth lesions  Throat:    no throat pain   no dysphagia  Neck:    no neck pain   no neck stiffness  Cardio:    Functional 4 Mets. Patient denies SOB walking up 2 flights of stairs   Limited with left hip pain; cooking, cleaning, grocery shopping  (push cart)   no chest pain   no palpitations   no peripheral edema   no dyspnea   no HATHAWAY  Respiratory:    no cough   no wheezing   no hemoptysis   no shortness of breath  Endocrine:    no cold intolerance   no heat intolerance  GI:    no abdominal distention   no abdominal pain   constipation   no diarrhea   no nausea   no vomiting   no blood in stool  :    no difficulty urinating   no dysuria   no oliguria  Musculoskeletal:    arthralgias (left hip)   no myalgias   decreased ROM (left hip)  Hematologic:    does not bruise/bleed easily   no excessive bleeding   history of blood transfusion   no blood clots  Skin:   no skin changes   no sores/wound   no rash      Physical Exam  Constitutional:       General: She is not in acute distress.     Appearance: Normal appearance. She is not ill-appearing, toxic-appearing or diaphoretic.   HENT:      Head: Normocephalic and atraumatic.      Nose: Nose normal. No congestion or rhinorrhea.      Mouth/Throat:      Mouth: Mucous membranes are moist.      Pharynx: No posterior oropharyngeal erythema.   Eyes:      Extraocular Movements: Extraocular movements intact.      Conjunctiva/sclera: Conjunctivae normal.   Cardiovascular:      Rate and Rhythm: Normal rate and regular rhythm.      Pulses: Normal pulses.      Heart sounds: Normal heart sounds. No murmur heard.     No friction rub. No gallop.   Pulmonary:      Effort: Pulmonary effort is normal. No respiratory distress.      Breath sounds: Normal breath sounds. No stridor. No wheezing or rhonchi.   Abdominal:      General: Bowel sounds are normal. There is no distension.      Palpations: Abdomen is soft. There is no mass.      Tenderness: There is no abdominal tenderness. There is no guarding or rebound.      Hernia: No hernia is present.   Musculoskeletal:         General: Tenderness (left hip; decreased ROM; pain with ext rotation of left hip) present. No swelling, deformity or signs of injury.      Cervical back: Normal  "range of motion and neck supple. No rigidity or tenderness.   Skin:     General: Skin is warm and dry.      Coloration: Skin is not jaundiced or pale.      Findings: No bruising, erythema, lesion or rash.   Neurological:      General: No focal deficit present.      Mental Status: She is alert and oriented to person, place, and time.      Cranial Nerves: No cranial nerve deficit.      Sensory: No sensory deficit.   Psychiatric:         Mood and Affect: Mood normal.         Behavior: Behavior normal.          PAT AIRWAY:   Airway:     Mallampati::  II    Neck ROM::  Full   No broken teeth, no dentures and no missing teeth          Visit Vitals  /75   Pulse 69   Temp 35.4 °C (95.7 °F)   Resp 18   Ht 1.626 m (5' 4\")   Wt 102 kg (225 lb 8.5 oz)   SpO2 100%   BMI 38.71 kg/m²   Smoking Status Never   BSA 2.15 m²           DASI Risk Score    No data to display       Caprini DVT Assessment    No data to display       Modified Frailty Index    No data to display       CHADS2 Stroke Risk  Current as of 8 minutes ago        N/A 3 - 100%: High Risk   2 - 3%: Medium Risk   0 - 2%: Low Risk     Last Change: N/A          This score determines the patient's risk of having a stroke if the patient has atrial fibrillation.        This score is not applicable to this patient. Components are not calculated.          Revised Cardiac Risk Index    No data to display       Apfel Simplified Score    No data to display       Risk Analysis Index Results This Encounter    No data found in the last 1 encounters.       LABS:  Lab Results   Component Value Date    WBC 7.6 12/08/2023    HGB 12.8 12/08/2023    HCT 38.9 12/08/2023    MCV 87 12/08/2023     (L) 12/08/2023      Lab Results   Component Value Date    GLUCOSE 90 12/08/2023    CALCIUM 9.6 12/08/2023     12/08/2023    K 3.9 12/08/2023    CO2 25 12/08/2023     12/08/2023    BUN 8 12/08/2023    CREATININE 0.56 12/08/2023      EKG 12/8/23  NSR  Incomplete " RBBB  Nonspecific T wave abnormality  Abnormal EKG  Vent rate = 68 bpm    EKG 9/27/22  Normal sinus rhythm  Incomplete right bundle branch block  Borderline ECG    Assessment and Plan:       Patient is a -53year-old female scheduled for a Left Hip Total Arthroplasty ~ Posterior Approach - Left  with Dr. William on 12/12/23.    Patient has no active cardiac symptoms.   Patient denies any chest pain, tightness, heaviness, pressure, radiating pain, palpitations, irregular heartbeats, lightheadedness, cough, congestion, shortness of breath, HATHAWAY, PND, near syncope, weight loss or gain.     RCRI  1 (type of surgery)  , 6 % Risk of MACE    Cardiac:  HTN - cont amlodipine on dos   Encouraged lifestyle modifications, low-sodium diet, and increase activity as tolerated.  Monitor BP and follow up with managing physician for readings sustaining >140/90.    HLD - cont statin on dos     Hematology:  ITP - 2007 - one time occurrence  12/8/23 PLT = 118    Patient instructed to ambulate as soon as possible postoperatively to decrease thromboembolic risk.   Initiate mechanical DVT prophylaxis as soon as possible and initiate chemical prophylaxis when deemed safe from a bleeding standpoint post surgery.     LABS: CBC, BMP, MRSA and EKG ordered. Lab results reviewed and show thrombocytopenia of 118; no additional testing required.    Followup: MRSA pending    STOP BANG: obese, >50 = 2    Caprini: 7    Risk assessment complete.  Patient is scheduled for a intermediate surgical risk procedure.        Preoperative medication instructions were provided and reviewed with the patient.  Any additional testing or evaluation was explained to the patient.  Nothing by mouth instructions were discussed and patient's questions were answered prior to conclusion to this encounter.  Patient verbalized understanding of preoperative instructions given in preadmission testing; discharge instructions available in EMR.    This note was dictated by a  speech recognition.  Minor errors may have been detected in a speech recognition.

## 2023-12-08 NOTE — PREPROCEDURE INSTRUCTIONS
Medication List            Accurate as of December 8, 2023  1:20 PM. Always use your most recent med list.                acetaminophen 650 mg ER tablet  Commonly known as: Tylenol 8 HOUR  Medication Adjustments for Surgery: Take morning of surgery with sip of water, no other fluids     amLODIPine 5 mg tablet  Commonly known as: Norvasc  TAKE 1 TABLET BY MOUTH EVERY DAY  Medication Adjustments for Surgery: Take morning of surgery with sip of water, no other fluids     atorvastatin 40 mg tablet  Commonly known as: Lipitor  TAKE 1 TABLET BY MOUTH EVERY DAY  Medication Adjustments for Surgery: Take morning of surgery with sip of water, no other fluids     chlorhexidine 0.12 % solution  Commonly known as: Peridex  Swish for 30 seconds and spit 15mL of solution the night before and morning of surgery     DIETARY SUPPLEMENT ORAL  Medication Adjustments for Surgery: Stop 7 days before surgery     WOMEN'S ONE DAILY ORAL  Medication Adjustments for Surgery: Stop 7 days before surgery                            CONTACT SURGEON'S OFFICE IF YOU DEVELOP:  * Fever = 100.4 F   * New respiratory symptoms (e.g. cough, shortness of breath, respiratory distress, sore throat)  * Recent loss of taste or smell  *Flu like symptoms such as headache, fatigue or gastrointestinal symptoms  * You develop any open sores, shingles, burning or painful urination   AND/OR:  * You no longer wish to have the surgery.  * Any other personal circumstances change that may lead to the need to cancel or defer this surgery.  *You were admitted to any hospital within one week of your planned procedure.    SMOKING:  *Quitting smoking can make a huge difference to your health and recovery from surgery.    *If you need help with quitting, call 3-764-QUIT-NOW.    THE DAY BEFORE SURGERY:  *Do not eat any food after midnight the night before surgery.   *You are permitted to drink clear liquids (i.e. water, black coffee, tea, clear broth, apple juice) up to 2  hours before your surgery.    DIABETICS:  If diabetic, nothing by mouth (no solids or liquids) for 8 hours prior to surgery.   Please check fasting blood sugar upon waking up.  If fasting sugar is <80 mg/dl, please drink 100ml/3oz of apple juice no later than 2 hours prior to surgery.      SURGICAL TIME  *You will be contacted between 2 p.m. and 6 p.m. the business day before your surgery with your arrival time.  *If you haven't received a call by 6pm, call [phone].  *Scheduled surgery times may change and you will be notified if this occurs-check your personal voicemail for any updates.    ON THE MORNING OF SURGERY:  *Wear comfortable, loose fitting clothing.   *Do not use moisturizers, creams, lotions or perfume.  *All jewelry and valuables should be left at home.  *Prosthetic devices such as contact lenses, hearing aids, dentures, eyelash extensions, hairpins and body piercing must be removed before surgery.    BRING WITH YOU:  *Photo ID and insurance card  *Current list of medicines and allergies  *Pacemaker/Defibrillator/Heart stent cards  *CPAP machine and mask  *Slings/splints/crutches  *Copy of your complete Advanced Directive/DHPOA-if applicable  *Neurostimulator implant remote    PARKING AND ARRIVAL:  *Check in at the Main Entrance desk and let them know you are here for surgery.  *You will be directed to the 2nd floor surgical waiting area.    AFTER OUTPATIENT SURGERY:  *A responsible adult MUST accompany you at the time of discharge and stay with you for 24 hours after your surgery.  *You may NOT drive yourself home after surgery.  *You may use a taxi or ride sharing service ([employer], [employer]) to return home ONLY if you are accompanied by a friend or family member.  *Instructions for resuming your medications will be provided by your surgeon.

## 2023-12-09 DIAGNOSIS — I10 ESSENTIAL (PRIMARY) HYPERTENSION: ICD-10-CM

## 2023-12-10 LAB — STAPHYLOCOCCUS SPEC CULT: NORMAL

## 2023-12-11 ENCOUNTER — ANESTHESIA EVENT (OUTPATIENT)
Dept: OPERATING ROOM | Facility: HOSPITAL | Age: 53
End: 2023-12-11
Payer: COMMERCIAL

## 2023-12-12 ENCOUNTER — HOME HEALTH ADMISSION (OUTPATIENT)
Dept: HOME HEALTH SERVICES | Facility: HOME HEALTH | Age: 53
End: 2023-12-12
Payer: COMMERCIAL

## 2023-12-12 ENCOUNTER — HOSPITAL ENCOUNTER (OUTPATIENT)
Facility: HOSPITAL | Age: 53
Discharge: HOME | End: 2023-12-12
Attending: ORTHOPAEDIC SURGERY | Admitting: ORTHOPAEDIC SURGERY
Payer: COMMERCIAL

## 2023-12-12 ENCOUNTER — PHARMACY VISIT (OUTPATIENT)
Dept: PHARMACY | Facility: CLINIC | Age: 53
End: 2023-12-12
Payer: MEDICARE

## 2023-12-12 ENCOUNTER — APPOINTMENT (OUTPATIENT)
Dept: RADIOLOGY | Facility: HOSPITAL | Age: 53
End: 2023-12-12
Payer: COMMERCIAL

## 2023-12-12 ENCOUNTER — ANESTHESIA (OUTPATIENT)
Dept: OPERATING ROOM | Facility: HOSPITAL | Age: 53
End: 2023-12-12
Payer: COMMERCIAL

## 2023-12-12 VITALS
OXYGEN SATURATION: 99 % | BODY MASS INDEX: 38.05 KG/M2 | WEIGHT: 222.88 LBS | HEART RATE: 65 BPM | RESPIRATION RATE: 16 BRPM | SYSTOLIC BLOOD PRESSURE: 107 MMHG | DIASTOLIC BLOOD PRESSURE: 64 MMHG | TEMPERATURE: 97.5 F | HEIGHT: 64 IN

## 2023-12-12 DIAGNOSIS — M16.12 PRIMARY OSTEOARTHRITIS OF LEFT HIP: Primary | ICD-10-CM

## 2023-12-12 PROBLEM — M16.10 ARTHRITIS, HIP: Status: ACTIVE | Noted: 2023-12-12

## 2023-12-12 PROBLEM — N39.0 URINARY TRACT INFECTION: Status: RESOLVED | Noted: 2023-07-26 | Resolved: 2023-12-12

## 2023-12-12 LAB — PLATELET # BLD AUTO: 113 X10*3/UL (ref 150–450)

## 2023-12-12 PROCEDURE — 7100000011 HC EXTENDED STAY RECOVERY HOURLY - NURSING UNIT

## 2023-12-12 PROCEDURE — A27130 PR TOTAL HIP ARTHROPLASTY

## 2023-12-12 PROCEDURE — A4217 STERILE WATER/SALINE, 500 ML: HCPCS | Performed by: ORTHOPAEDIC SURGERY

## 2023-12-12 PROCEDURE — 2500000004 HC RX 250 GENERAL PHARMACY W/ HCPCS (ALT 636 FOR OP/ED): Performed by: ORTHOPAEDIC SURGERY

## 2023-12-12 PROCEDURE — C1776 JOINT DEVICE (IMPLANTABLE): HCPCS | Performed by: ORTHOPAEDIC SURGERY

## 2023-12-12 PROCEDURE — 7100000009 HC PHASE TWO TIME - INITIAL BASE CHARGE: Performed by: ORTHOPAEDIC SURGERY

## 2023-12-12 PROCEDURE — 27130 TOTAL HIP ARTHROPLASTY: CPT | Performed by: ORTHOPAEDIC SURGERY

## 2023-12-12 PROCEDURE — 97161 PT EVAL LOW COMPLEX 20 MIN: CPT | Mod: GP

## 2023-12-12 PROCEDURE — 36415 COLL VENOUS BLD VENIPUNCTURE: CPT | Performed by: ANESTHESIOLOGY

## 2023-12-12 PROCEDURE — 2500000001 HC RX 250 WO HCPCS SELF ADMINISTERED DRUGS (ALT 637 FOR MEDICARE OP): Performed by: ANESTHESIOLOGY

## 2023-12-12 PROCEDURE — 2500000004 HC RX 250 GENERAL PHARMACY W/ HCPCS (ALT 636 FOR OP/ED): Performed by: ANESTHESIOLOGY

## 2023-12-12 PROCEDURE — 2720000007 HC OR 272 NO HCPCS: Performed by: ORTHOPAEDIC SURGERY

## 2023-12-12 PROCEDURE — 2500000005 HC RX 250 GENERAL PHARMACY W/O HCPCS: Performed by: ORTHOPAEDIC SURGERY

## 2023-12-12 PROCEDURE — 2500000005 HC RX 250 GENERAL PHARMACY W/O HCPCS

## 2023-12-12 PROCEDURE — 72170 X-RAY EXAM OF PELVIS: CPT | Performed by: RADIOLOGY

## 2023-12-12 PROCEDURE — 97535 SELF CARE MNGMENT TRAINING: CPT | Mod: GO | Performed by: OCCUPATIONAL THERAPIST

## 2023-12-12 PROCEDURE — 97116 GAIT TRAINING THERAPY: CPT | Mod: GP

## 2023-12-12 PROCEDURE — 2500000001 HC RX 250 WO HCPCS SELF ADMINISTERED DRUGS (ALT 637 FOR MEDICARE OP): Performed by: ORTHOPAEDIC SURGERY

## 2023-12-12 PROCEDURE — 72170 X-RAY EXAM OF PELVIS: CPT

## 2023-12-12 PROCEDURE — P9045 ALBUMIN (HUMAN), 5%, 250 ML: HCPCS | Mod: JZ

## 2023-12-12 PROCEDURE — A6213 FOAM DRG >16<=48 SQ IN W/BDR: HCPCS | Performed by: ORTHOPAEDIC SURGERY

## 2023-12-12 PROCEDURE — A27130 PR TOTAL HIP ARTHROPLASTY: Performed by: ANESTHESIOLOGY

## 2023-12-12 PROCEDURE — RXMED WILLOW AMBULATORY MEDICATION CHARGE

## 2023-12-12 PROCEDURE — 85049 AUTOMATED PLATELET COUNT: CPT | Performed by: ANESTHESIOLOGY

## 2023-12-12 PROCEDURE — 97165 OT EVAL LOW COMPLEX 30 MIN: CPT | Mod: GO | Performed by: OCCUPATIONAL THERAPIST

## 2023-12-12 PROCEDURE — 3700000001 HC GENERAL ANESTHESIA TIME - INITIAL BASE CHARGE: Performed by: ORTHOPAEDIC SURGERY

## 2023-12-12 PROCEDURE — 2500000004 HC RX 250 GENERAL PHARMACY W/ HCPCS (ALT 636 FOR OP/ED)

## 2023-12-12 PROCEDURE — 7100000002 HC RECOVERY ROOM TIME - EACH INCREMENTAL 1 MINUTE: Performed by: ORTHOPAEDIC SURGERY

## 2023-12-12 PROCEDURE — 2780000003 HC OR 278 NO HCPCS: Performed by: ORTHOPAEDIC SURGERY

## 2023-12-12 PROCEDURE — 3700000002 HC GENERAL ANESTHESIA TIME - EACH INCREMENTAL 1 MINUTE: Performed by: ORTHOPAEDIC SURGERY

## 2023-12-12 PROCEDURE — 3600000018 HC OR TIME - INITIAL BASE CHARGE - PROCEDURE LEVEL SIX: Performed by: ORTHOPAEDIC SURGERY

## 2023-12-12 PROCEDURE — 7100000010 HC PHASE TWO TIME - EACH INCREMENTAL 1 MINUTE: Performed by: ORTHOPAEDIC SURGERY

## 2023-12-12 PROCEDURE — 3600000017 HC OR TIME - EACH INCREMENTAL 1 MINUTE - PROCEDURE LEVEL SIX: Performed by: ORTHOPAEDIC SURGERY

## 2023-12-12 PROCEDURE — 7100000001 HC RECOVERY ROOM TIME - INITIAL BASE CHARGE: Performed by: ORTHOPAEDIC SURGERY

## 2023-12-12 DEVICE — ACETABULAR CUP, SECTOR, GRIPTON, SIZE 52MM: Type: IMPLANTABLE DEVICE | Site: HIP | Status: FUNCTIONAL

## 2023-12-12 DEVICE — BIOLOX DELTA CERAMIC FEMORAL HEAD +5.0 36MM DIA 12/14 TAPER
Type: IMPLANTABLE DEVICE | Site: HIP | Status: FUNCTIONAL
Brand: BIOLOX DELTA

## 2023-12-12 DEVICE — ACTIS DUOFIX HIP PROSTHESIS (FEMORAL STEM 12/14 TAPER CEMENTLESS SIZE 2 STD COLLAR)  CE
Type: IMPLANTABLE DEVICE | Site: HIP | Status: FUNCTIONAL
Brand: ACTIS

## 2023-12-12 DEVICE — LINER, ALTRX, NEURTAL, 36 X 52MM: Type: IMPLANTABLE DEVICE | Site: HIP | Status: FUNCTIONAL

## 2023-12-12 RX ORDER — POLYETHYLENE GLYCOL 3350 17 G/17G
17 POWDER, FOR SOLUTION ORAL DAILY
Status: DISCONTINUED | OUTPATIENT
Start: 2023-12-12 | End: 2023-12-12 | Stop reason: HOSPADM

## 2023-12-12 RX ORDER — SODIUM CHLORIDE, SODIUM LACTATE, POTASSIUM CHLORIDE, CALCIUM CHLORIDE 600; 310; 30; 20 MG/100ML; MG/100ML; MG/100ML; MG/100ML
100 INJECTION, SOLUTION INTRAVENOUS CONTINUOUS
Status: DISCONTINUED | OUTPATIENT
Start: 2023-12-12 | End: 2023-12-12 | Stop reason: HOSPADM

## 2023-12-12 RX ORDER — CEFAZOLIN 1 G/1
INJECTION, POWDER, FOR SOLUTION INTRAVENOUS AS NEEDED
Status: DISCONTINUED | OUTPATIENT
Start: 2023-12-12 | End: 2023-12-12

## 2023-12-12 RX ORDER — ATORVASTATIN CALCIUM 10 MG/1
40 TABLET, FILM COATED ORAL DAILY
Status: DISCONTINUED | OUTPATIENT
Start: 2023-12-12 | End: 2023-12-12 | Stop reason: HOSPADM

## 2023-12-12 RX ORDER — ALBUMIN HUMAN 50 G/1000ML
SOLUTION INTRAVENOUS AS NEEDED
Status: DISCONTINUED | OUTPATIENT
Start: 2023-12-12 | End: 2023-12-12

## 2023-12-12 RX ORDER — DEXAMETHASONE SODIUM PHOSPHATE 4 MG/ML
INJECTION, SOLUTION INTRA-ARTICULAR; INTRALESIONAL; INTRAMUSCULAR; INTRAVENOUS; SOFT TISSUE AS NEEDED
Status: DISCONTINUED | OUTPATIENT
Start: 2023-12-12 | End: 2023-12-12

## 2023-12-12 RX ORDER — PHENYLEPHRINE HCL IN 0.9% NACL 1 MG/10 ML
SYRINGE (ML) INTRAVENOUS AS NEEDED
Status: DISCONTINUED | OUTPATIENT
Start: 2023-12-12 | End: 2023-12-12

## 2023-12-12 RX ORDER — OXYCODONE HYDROCHLORIDE 5 MG/1
10 TABLET ORAL EVERY 4 HOURS PRN
Status: DISCONTINUED | OUTPATIENT
Start: 2023-12-12 | End: 2023-12-12 | Stop reason: HOSPADM

## 2023-12-12 RX ORDER — ONDANSETRON HYDROCHLORIDE 2 MG/ML
INJECTION, SOLUTION INTRAVENOUS AS NEEDED
Status: DISCONTINUED | OUTPATIENT
Start: 2023-12-12 | End: 2023-12-12

## 2023-12-12 RX ORDER — PREGABALIN 75 MG/1
75 CAPSULE ORAL ONCE
Status: COMPLETED | OUTPATIENT
Start: 2023-12-12 | End: 2023-12-12

## 2023-12-12 RX ORDER — TRANEXAMIC ACID 100 MG/ML
INJECTION, SOLUTION INTRAVENOUS AS NEEDED
Status: DISCONTINUED | OUTPATIENT
Start: 2023-12-12 | End: 2023-12-12

## 2023-12-12 RX ORDER — NALOXONE HYDROCHLORIDE 1 MG/ML
0.2 INJECTION INTRAMUSCULAR; INTRAVENOUS; SUBCUTANEOUS EVERY 5 MIN PRN
Status: DISCONTINUED | OUTPATIENT
Start: 2023-12-12 | End: 2023-12-12 | Stop reason: HOSPADM

## 2023-12-12 RX ORDER — MIDAZOLAM HYDROCHLORIDE 1 MG/ML
INJECTION INTRAMUSCULAR; INTRAVENOUS AS NEEDED
Status: DISCONTINUED | OUTPATIENT
Start: 2023-12-12 | End: 2023-12-12

## 2023-12-12 RX ORDER — ASPIRIN 81 MG/1
81 TABLET ORAL 2 TIMES DAILY
Status: DISCONTINUED | OUTPATIENT
Start: 2023-12-12 | End: 2023-12-12 | Stop reason: HOSPADM

## 2023-12-12 RX ORDER — SODIUM CHLORIDE 0.9 G/100ML
IRRIGANT IRRIGATION AS NEEDED
Status: DISCONTINUED | OUTPATIENT
Start: 2023-12-12 | End: 2023-12-12 | Stop reason: HOSPADM

## 2023-12-12 RX ORDER — ONDANSETRON HYDROCHLORIDE 2 MG/ML
4 INJECTION, SOLUTION INTRAVENOUS ONCE AS NEEDED
Status: DISCONTINUED | OUTPATIENT
Start: 2023-12-12 | End: 2023-12-12 | Stop reason: HOSPADM

## 2023-12-12 RX ORDER — CEFAZOLIN SODIUM 2 G/100ML
2 INJECTION, SOLUTION INTRAVENOUS ONCE
Status: COMPLETED | OUTPATIENT
Start: 2023-12-12 | End: 2023-12-12

## 2023-12-12 RX ORDER — HYDRALAZINE HYDROCHLORIDE 20 MG/ML
5 INJECTION INTRAMUSCULAR; INTRAVENOUS EVERY 30 MIN PRN
Status: DISCONTINUED | OUTPATIENT
Start: 2023-12-12 | End: 2023-12-12 | Stop reason: HOSPADM

## 2023-12-12 RX ORDER — TRAMADOL HYDROCHLORIDE 50 MG/1
100 TABLET ORAL EVERY 6 HOURS PRN
Qty: 28 TABLET | Refills: 0 | Status: SHIPPED | OUTPATIENT
Start: 2023-12-12 | End: 2024-01-04

## 2023-12-12 RX ORDER — AMLODIPINE BESYLATE 5 MG/1
TABLET ORAL
Qty: 30 TABLET | Refills: 0 | Status: SHIPPED | OUTPATIENT
Start: 2023-12-12 | End: 2024-04-23

## 2023-12-12 RX ORDER — OXYCODONE HYDROCHLORIDE 5 MG/1
5 TABLET ORAL EVERY 6 HOURS PRN
Status: DISCONTINUED | OUTPATIENT
Start: 2023-12-12 | End: 2023-12-12 | Stop reason: HOSPADM

## 2023-12-12 RX ORDER — ROPIVACAINE/EPI/CLONIDINE/KET 2.46-0.005
SYRINGE (ML) INJECTION AS NEEDED
Status: DISCONTINUED | OUTPATIENT
Start: 2023-12-12 | End: 2023-12-12 | Stop reason: HOSPADM

## 2023-12-12 RX ORDER — NAPROXEN SODIUM 220 MG/1
81 TABLET, FILM COATED ORAL 2 TIMES DAILY
Qty: 28 TABLET | Refills: 0 | Status: SHIPPED | OUTPATIENT
Start: 2023-12-12 | End: 2023-12-26

## 2023-12-12 RX ORDER — TRANEXAMIC ACID 650 MG/1
1950 TABLET ORAL ONCE
Status: DISCONTINUED | OUTPATIENT
Start: 2023-12-13 | End: 2023-12-12 | Stop reason: HOSPADM

## 2023-12-12 RX ORDER — MELOXICAM 7.5 MG/1
7.5 TABLET ORAL DAILY
Qty: 14 TABLET | Refills: 0 | Status: SHIPPED | OUTPATIENT
Start: 2023-12-12 | End: 2023-12-26

## 2023-12-12 RX ORDER — OXYCODONE HYDROCHLORIDE 5 MG/1
5 TABLET ORAL EVERY 6 HOURS PRN
Qty: 28 TABLET | Refills: 0 | Status: SHIPPED | OUTPATIENT
Start: 2023-12-12 | End: 2024-01-04

## 2023-12-12 RX ORDER — ACETAMINOPHEN 325 MG/1
650 TABLET ORAL EVERY 6 HOURS SCHEDULED
Status: DISCONTINUED | OUTPATIENT
Start: 2023-12-12 | End: 2023-12-12 | Stop reason: HOSPADM

## 2023-12-12 RX ORDER — OXYCODONE HYDROCHLORIDE 5 MG/1
5 TABLET ORAL EVERY 4 HOURS PRN
Status: DISCONTINUED | OUTPATIENT
Start: 2023-12-12 | End: 2023-12-12 | Stop reason: HOSPADM

## 2023-12-12 RX ORDER — LIDOCAINE HYDROCHLORIDE 20 MG/ML
INJECTION, SOLUTION EPIDURAL; INFILTRATION; INTRACAUDAL; PERINEURAL AS NEEDED
Status: DISCONTINUED | OUTPATIENT
Start: 2023-12-12 | End: 2023-12-12

## 2023-12-12 RX ORDER — ACETAMINOPHEN 325 MG/1
975 TABLET ORAL ONCE
Status: COMPLETED | OUTPATIENT
Start: 2023-12-12 | End: 2023-12-12

## 2023-12-12 RX ORDER — TRANEXAMIC ACID 650 MG/1
1950 TABLET ORAL ONCE
Status: COMPLETED | OUTPATIENT
Start: 2023-12-12 | End: 2023-12-12

## 2023-12-12 RX ORDER — FENTANYL CITRATE 50 UG/ML
INJECTION, SOLUTION INTRAMUSCULAR; INTRAVENOUS AS NEEDED
Status: DISCONTINUED | OUTPATIENT
Start: 2023-12-12 | End: 2023-12-12

## 2023-12-12 RX ORDER — HYDROMORPHONE HYDROCHLORIDE 1 MG/ML
INJECTION, SOLUTION INTRAMUSCULAR; INTRAVENOUS; SUBCUTANEOUS AS NEEDED
Status: DISCONTINUED | OUTPATIENT
Start: 2023-12-12 | End: 2023-12-12

## 2023-12-12 RX ORDER — PROPOFOL 10 MG/ML
INJECTION, EMULSION INTRAVENOUS CONTINUOUS PRN
Status: DISCONTINUED | OUTPATIENT
Start: 2023-12-12 | End: 2023-12-12

## 2023-12-12 RX ORDER — AMLODIPINE BESYLATE 5 MG/1
5 TABLET ORAL DAILY
Status: DISCONTINUED | OUTPATIENT
Start: 2023-12-12 | End: 2023-12-12 | Stop reason: HOSPADM

## 2023-12-12 RX ORDER — CYCLOBENZAPRINE HCL 10 MG
10 TABLET ORAL 3 TIMES DAILY PRN
Status: DISCONTINUED | OUTPATIENT
Start: 2023-12-12 | End: 2023-12-12 | Stop reason: HOSPADM

## 2023-12-12 RX ORDER — DOCUSATE SODIUM 100 MG/1
100 CAPSULE, LIQUID FILLED ORAL 2 TIMES DAILY
Qty: 28 CAPSULE | Refills: 0 | Status: SHIPPED | OUTPATIENT
Start: 2023-12-12 | End: 2023-12-26

## 2023-12-12 RX ORDER — CEFAZOLIN SODIUM 2 G/100ML
2 INJECTION, SOLUTION INTRAVENOUS EVERY 8 HOURS
Status: DISCONTINUED | OUTPATIENT
Start: 2023-12-12 | End: 2023-12-12

## 2023-12-12 RX ORDER — MELOXICAM 7.5 MG/1
7.5 TABLET ORAL ONCE
Status: COMPLETED | OUTPATIENT
Start: 2023-12-12 | End: 2023-12-12

## 2023-12-12 RX ADMIN — HYDROMORPHONE HYDROCHLORIDE 0.4 MG: 1 INJECTION, SOLUTION INTRAMUSCULAR; INTRAVENOUS; SUBCUTANEOUS at 13:17

## 2023-12-12 RX ADMIN — Medication 200 MCG: at 12:48

## 2023-12-12 RX ADMIN — LIDOCAINE HYDROCHLORIDE 30 MG: 20 INJECTION, SOLUTION EPIDURAL; INFILTRATION; INTRACAUDAL; PERINEURAL at 11:27

## 2023-12-12 RX ADMIN — MIDAZOLAM HYDROCHLORIDE 1 MG: 1 INJECTION, SOLUTION INTRAMUSCULAR; INTRAVENOUS at 11:11

## 2023-12-12 RX ADMIN — Medication 100 MCG: at 12:06

## 2023-12-12 RX ADMIN — HYDROMORPHONE HYDROCHLORIDE 0.5 MG: 1 INJECTION, SOLUTION INTRAMUSCULAR; INTRAVENOUS; SUBCUTANEOUS at 13:27

## 2023-12-12 RX ADMIN — DEXAMETHASONE SODIUM PHOSPHATE 4 MG: 4 INJECTION, SOLUTION INTRA-ARTICULAR; INTRALESIONAL; INTRAMUSCULAR; INTRAVENOUS; SOFT TISSUE at 11:39

## 2023-12-12 RX ADMIN — Medication 100 MCG: at 12:08

## 2023-12-12 RX ADMIN — TRANEXAMIC ACID 1000 MG: 100 INJECTION, SOLUTION INTRAVENOUS at 11:24

## 2023-12-12 RX ADMIN — Medication 200 MCG: at 13:03

## 2023-12-12 RX ADMIN — MIDAZOLAM HYDROCHLORIDE 1 MG: 1 INJECTION, SOLUTION INTRAMUSCULAR; INTRAVENOUS at 11:16

## 2023-12-12 RX ADMIN — OXYCODONE HYDROCHLORIDE 5 MG: 5 TABLET ORAL at 14:51

## 2023-12-12 RX ADMIN — Medication 100 MCG: at 12:21

## 2023-12-12 RX ADMIN — POVIDONE-IODINE 1 APPLICATION: 5 SOLUTION TOPICAL at 08:58

## 2023-12-12 RX ADMIN — Medication 100 MCG: at 12:18

## 2023-12-12 RX ADMIN — GLYCOPYRROLATE 0.2 MCG: 0.2 INJECTION, SOLUTION INTRAMUSCULAR; INTRAVITREAL at 11:39

## 2023-12-12 RX ADMIN — Medication 100 MCG: at 12:12

## 2023-12-12 RX ADMIN — Medication 200 MCG: at 12:25

## 2023-12-12 RX ADMIN — Medication 100 MCG: at 11:39

## 2023-12-12 RX ADMIN — MELOXICAM 7.5 MG: 7.5 TABLET ORAL at 08:56

## 2023-12-12 RX ADMIN — Medication 200 MCG: at 12:35

## 2023-12-12 RX ADMIN — Medication 200 MCG: at 12:30

## 2023-12-12 RX ADMIN — Medication 100 MCG: at 12:14

## 2023-12-12 RX ADMIN — Medication 100 MCG: at 11:47

## 2023-12-12 RX ADMIN — ONDANSETRON 4 MG: 2 INJECTION, SOLUTION INTRAMUSCULAR; INTRAVENOUS at 12:39

## 2023-12-12 RX ADMIN — ALBUMIN HUMAN 250 ML: 0.05 INJECTION, SOLUTION INTRAVENOUS at 12:09

## 2023-12-12 RX ADMIN — HYDROMORPHONE HYDROCHLORIDE 0.5 MG: 1 INJECTION, SOLUTION INTRAMUSCULAR; INTRAVENOUS; SUBCUTANEOUS at 13:35

## 2023-12-12 RX ADMIN — Medication 100 MCG: at 11:50

## 2023-12-12 RX ADMIN — PROPOFOL 75 MCG/KG/MIN: 10 INJECTION, EMULSION INTRAVENOUS at 11:27

## 2023-12-12 RX ADMIN — ACETAMINOPHEN 975 MG: 325 TABLET ORAL at 08:56

## 2023-12-12 RX ADMIN — Medication 100 MCG: at 12:02

## 2023-12-12 RX ADMIN — TRANEXAMIC ACID 1950 MG: 650 TABLET ORAL at 16:35

## 2023-12-12 RX ADMIN — Medication 200 MCG: at 13:08

## 2023-12-12 RX ADMIN — PREGABALIN 75 MG: 75 CAPSULE ORAL at 08:58

## 2023-12-12 RX ADMIN — MEPIVACAINE HYDROCHLORIDE 3.5 ML: 15 INJECTION, SOLUTION EPIDURAL; INFILTRATION at 11:21

## 2023-12-12 RX ADMIN — CEFAZOLIN 2 G: 330 INJECTION, POWDER, FOR SOLUTION INTRAMUSCULAR; INTRAVENOUS at 11:24

## 2023-12-12 RX ADMIN — SODIUM CHLORIDE, SODIUM LACTATE, POTASSIUM CHLORIDE, AND CALCIUM CHLORIDE: 600; 310; 30; 20 INJECTION, SOLUTION INTRAVENOUS at 11:11

## 2023-12-12 RX ADMIN — FENTANYL CITRATE 50 MCG: 50 INJECTION, SOLUTION INTRAMUSCULAR; INTRAVENOUS at 11:19

## 2023-12-12 RX ADMIN — CEFAZOLIN SODIUM 2 G: 2 INJECTION, SOLUTION INTRAVENOUS at 16:56

## 2023-12-12 RX ADMIN — HYDROMORPHONE HYDROCHLORIDE 0.2 MG: 1 INJECTION, SOLUTION INTRAMUSCULAR; INTRAVENOUS; SUBCUTANEOUS at 13:24

## 2023-12-12 RX ADMIN — HYDROMORPHONE HYDROCHLORIDE 0.5 MG: 1 INJECTION, SOLUTION INTRAMUSCULAR; INTRAVENOUS; SUBCUTANEOUS at 13:41

## 2023-12-12 RX ADMIN — Medication 100 MCG: at 11:59

## 2023-12-12 ASSESSMENT — COGNITIVE AND FUNCTIONAL STATUS - GENERAL
HELP NEEDED FOR BATHING: A LITTLE
DAILY ACTIVITIY SCORE: 19
TURNING FROM BACK TO SIDE WHILE IN FLAT BAD: A LITTLE
MOVING FROM LYING ON BACK TO SITTING ON SIDE OF FLAT BED WITH BEDRAILS: A LITTLE
DRESSING REGULAR UPPER BODY CLOTHING: A LITTLE
MOBILITY SCORE: 22
TOILETING: A LITTLE
DRESSING REGULAR LOWER BODY CLOTHING: A LOT

## 2023-12-12 ASSESSMENT — PAIN SCALES - GENERAL
PAINLEVEL_OUTOF10: 8
PAINLEVEL_OUTOF10: 1
PAINLEVEL_OUTOF10: 10 - WORST POSSIBLE PAIN
PAINLEVEL_OUTOF10: 3
PAINLEVEL_OUTOF10: 6
PAINLEVEL_OUTOF10: 10 - WORST POSSIBLE PAIN
PAINLEVEL_OUTOF10: 5 - MODERATE PAIN
PAINLEVEL_OUTOF10: 3
PAINLEVEL_OUTOF10: 10 - WORST POSSIBLE PAIN
PAINLEVEL_OUTOF10: 3
PAINLEVEL_OUTOF10: 5 - MODERATE PAIN
PAINLEVEL_OUTOF10: 10 - WORST POSSIBLE PAIN
PAINLEVEL_OUTOF10: 6

## 2023-12-12 ASSESSMENT — PAIN - FUNCTIONAL ASSESSMENT
PAIN_FUNCTIONAL_ASSESSMENT: 0-10
PAIN_FUNCTIONAL_ASSESSMENT: UNABLE TO SELF-REPORT
PAIN_FUNCTIONAL_ASSESSMENT: 0-10
PAIN_FUNCTIONAL_ASSESSMENT: 0-10
PAIN_FUNCTIONAL_ASSESSMENT: UNABLE TO SELF-REPORT
PAIN_FUNCTIONAL_ASSESSMENT: 0-10

## 2023-12-12 ASSESSMENT — ACTIVITIES OF DAILY LIVING (ADL)
ADL_ASSISTANCE: INDEPENDENT
ADL_ASSISTANCE: INDEPENDENT
HOME_MANAGEMENT_TIME_ENTRY: 15

## 2023-12-12 ASSESSMENT — COLUMBIA-SUICIDE SEVERITY RATING SCALE - C-SSRS
2. HAVE YOU ACTUALLY HAD ANY THOUGHTS OF KILLING YOURSELF?: NO
1. IN THE PAST MONTH, HAVE YOU WISHED YOU WERE DEAD OR WISHED YOU COULD GO TO SLEEP AND NOT WAKE UP?: NO
6. HAVE YOU EVER DONE ANYTHING, STARTED TO DO ANYTHING, OR PREPARED TO DO ANYTHING TO END YOUR LIFE?: NO

## 2023-12-12 NOTE — PERIOPERATIVE NURSING NOTE
1318:Patient to bay with anesthesia present, plan of care reviewed. Report received from HOLLIE MONTES. Initial assessment complete.     1327: 0.5mg iv dilaudid given per order for pain. Family updated via text message.    1330: XRAY to bedside. Pt placed on RA.    1335: 0.5mg Iv dilaudid given per order for pain. Ice machine and cold packs applied to hip.    1340. 0.5mg IV dilaudid given per order for pain.    1400: patient states pain is getting better about 6/10 but refused pain medication at this time. Pt is resting comfortably at this time.    1415: Handoff report given to Yenifer GIANG.     1450: Report received from Deloris GIANG. Pt states she feels groggy and is in pain.    1451: Deloris GIANG gave 5mg oxycodone per order for pain.    1456: Dr. William to bedside to speak with pt. Pt states she would like to rest before PT/ OT evaluation.    1500: PT/ OT updated via [employer]. Pt sleeping at this time. No verbal complaints of pain or discomfort , no visible signs of pain or discomfort at this time.    1514: Phase I CARE COMPLETE    1515: POD 0 Start.    1521: meds to beds pharmacist to bedside to speak with pt regarding medications and deliver meds.    1540: OT at bedside to work with patient, OT assisted pt with dressing.  1600: PT at bedside to work with patient.    1604: OT completed care with pt.       1617: per Physical Therapy pt voided. Pt practicing exercises with PT. Patient sitting in recliner.     1635: TXA given per order.    1700: IV Antibiotic initiated per order. Discharge instructions reviewed with pt and family. Pt and family verbalized understanding.    1715: Phase II start.    1730: Antibiotic completed. IV flushed. IV removed without difficulty. Cath intact upon removal. Pt tolerated removal well.    1737: Patient transported to [facility] via wheelchair and transport.

## 2023-12-12 NOTE — DISCHARGE INSTRUCTIONS
Additional instructions  Ice/Elevate operative extremity.  OK to shower.  Keep Mepilex dressing in place.  Weightbearing: WBAT on operative extremity with crutches/walker.   Home PT to visit POD #1 or 2.   Start Tylenol 650 mg by mouth every 6 hours  for pain.  Start Oxycodone 1 by mouth every 6 hours as needed for pain.  Tramadol 1 tablet every 6  hours as needed for breakthrough pain.  Start Meloxicam 7.5 mg daily for 2 weeks POD #1.  Colace 100 mg twice a day for constipation.  Aspirin 81 mg by mouth twice daily. Start POD #1.  F/U with Dr. William in 2 weeks.  Call [phone] for appointment time.      Postoperative Instructions: Total Joint Replacement    POSTOPERATIVE MEDICATIONS  PAIN MEDICATION  Pain medications have been prescribed for post-operative pain control. Take in conjunction with ice/cold therapy to assist with swelling and pain. If prescribed multiple pain medications, be sure to alternate administration times throughout the day so that you can take something every few hours. Consider taking Tylenol in between narcotic administration times (keep in mind Percocet/Vicodin contain Tylenol and not to exceed Tylenol limit of 4grams in 24 hours). Prescription will generally be for 7 days at a time and refills will be sent upon request. For refills, request via [employer] or call surgeon's office during business hours and follow up with your pharmacy regarding status and pickup.    Side effects may be constipation and nausea, vomiting, sleepiness, dizziness, lightheadedness, headache, blurred vision, dry mouth, sweating, itching (if you have itching, over-the -counter Benadryl can be used as needed).  You may NOT operate a motor vehicle while taking narcotic pain medication.    BLOOD THINNER  Aspirin or another medication has been prescribed as a blood thinner to prevent blood clots in your leg or lungs. Take as prescribed on the bottle. You will not receive a refill on this medication.  Do not take this  medication if you are on another blood thinner.  Do not take any anti-inflammatory medications such as Meloxicam, Celebrex, Ibuprofen, Motrin, Aleve, or Advil while using the Aspirin or another blood thinner unless instructed otherwise by your surgeon.       STOOL SOFTENERS/LAXATIVES  Post-operative constipation can result due to a combination of inactivity, anesthesia and pain medication. To help prevent this, you should increase your water and fiber intake. Physical activity, such as walking, will also help stimulate the bowels. If you are not having regular bowel movements, increase your bowel regimen!  Consider constipation prevention and treatment medications if not prescribed by your surgeon. These are available over the counter at the drug store (The pharmacist on staff may also make recommendations):  Stool Softener: Colace  Laxative: Senna, Miralax, Milk of Magnesia, Magnesium Citrate    WOUND CARE  Pain and swelling are normal following surgery and can last for weeks to months depending on the patient.  To help relieve these symptoms, please follow the post-operative pain regimen as it has been prescribed, use ice often, wear compression stockings every day as prescribed, and elevate your leg every hour.   You have a waterproof bandage on your wound and may shower with this on. The waterproof bandage is to remain in place for a minimum of 6 -7 days. Home care will remove it. You may leave your incision open to air after the bandage has been removed. Once the dressing is removed, you may see steri strips, surgical mesh, or glue. Do not peel or cut any of these items, they will fall off on their own or your surgeon will address at your follow up appointment.   DO NOT soak your incision in a bath, hot tub, pool or pond/lake for a minimum of 8 weeks following your surgery.  DO NOT use lotions, creams, ointments on your wound for a minimum of 6 weeks following your surgery. At that time you may use vitamin E to  assist with softening of your incision.    ___x__ TOTAL HIP ARTHROPLASTY  After surgery, you will have a compression stocking on your operative leg. Continue to wear the compression stocking for 4 weeks to prevent blood clots in your leg or lungs. Remove compression stockings at night.  If there is continued drainage or bleeding, cover with an abdominal pad and tape.     _____ TOTAL KNEE ARTHROPLASTY  After surgery, you will have a compression stocking and ACE wrap on your operative leg. Continue to wear the compression stocking for 4 weeks to prevent blood clots in your leg or lungs. Remove compression stockings at night. ACE wrap can be removed on postop day 1 or removed by homecare therapist at their first visit. Can use ACE wrap intermittently for swelling.  Elevate your leg periodically to help with swelling. BE SURE to place the support under your ankle, NOT under your knee. You want your knee as straight as possible.    JOINT CARE TEAM  For nursing or wound care questions within the first 6 weeks after surgery, please contact the joint replacement nurse at the facility where you had surgery.  If you are leaving a message, please include your full name, date of birth and date of surgery so that we can identify correctly identify you.  For messages left outside of normal business hours, your call will be returned on the next business day.  Please do not leave emergent messages outside of normal business hours that cannot wait until the next business day.  For orthopedic concerns longer than 6 weeks after your surgery, you will need to call the office to schedule an appointment to be seen.    [facility]:  Alaina Antonio RN, [phone]            RESTARTING HOME MEDICATIONS/DIET  You may restart your home medications the following day after your surgery UNLESS you have been given alternate instructions.  Follow the instructions given to you on your hospital discharge instructions for more information  regarding your home medications.  Resume your normal diet after surgery. If you are on a specific type of diet for your condition, resume that instead.  Choose foods that help promote good bowel habits and prevent constipation, such as foods high in fiber. Be sure to drink water to stay hydrated and to prevent constipation.       DENTAL PROCEDURES & CLEANINGS  All patients must wait a minimum of 3 months for elective dental appointments, including routine cleanings, as to prevent total joint replacement infections. Please refer to your surgeon as to whether you will need antibiotics for future dental appointments.     EMERGENCIES  When to contact our office immediately:  Fever >101.5 for at least 48 hours after surgery or chills.  Excessive bleeding from incision(s). A small amount of drainage is normal and expected.  Signs of infection of incision(s)-excessive drainage that is soaking through your dressing (especially if it is pus-like), redness that is spreading out from the edges of your incision, or increased warmth around the area.  Excruciating pain for which the pain medication, taken as instructed, is not helping.  Severe calf pain.  Go directly to the emergency room or call 911, if you are experiencing chest pain, shortness of breath, or difficulty breathing.    IN-HOME PHYSICAL THERAPY & OUTPATIENT PHYSICAL THERAPY  In-home physical therapy will start 1-2 days after you get home from the hospital.  The home care agency will call you prior to their first visit.  Please refer to the contact information on your hospital discharge instructions if you need to contact them.  Make sure to provide a phone number with the ability for the home care staff to leave a message if you do not answer your phone.  Please continue to complete the assigned home exercises two times per day on the days that physical therapy is not scheduled to come to your home.    Following a total knee replacement, you should plan to  transition from in-home therapy to outpatient therapy 2-3 weeks after surgery.  Patients should be making their first appointment several weeks in advance to avoid delays. You may use the physical therapy location of your choice; it is the patient's responsibility to make sure the location chosen is covered by insurance.  If you are having a hip replacement and need additional therapy, please contact the office for an order.    It is common to have a temporary increase in pain and swelling upon starting outpatient physical therapy and/or changing your exercise routine.  Continue to use ice to help with symptoms.    DRIVING & TRAVEL AFTER SURGERY   Patients should anticipate waiting at least 4-6 weeks before traveling long distances after surgery.  You will need to stop to walk around ever 1 hour during your travel to help with blood clot prevention.  Please call the office or your joint nurse to discuss prior to post-surgical travel.  Patients may not drive until cleared by the joint nurse or the office.    FOLLOW-UP APPOINTMENT  Please call your surgeon's office within 2 weeks following surgery to schedule a follow up visit.    ICE  You have been prescribed to ice your total joint at a minimum of twice per hour for 20 minutes while awake during the first 6 weeks after surgery if you are using ice packs. This will help with pain control. Be sure to have a layer of protection between the ice pack and your skin. Ice packs should be rotated on for 20 minutes and off for 20 minutes to prevent frost bite.   If you are using an ice machine, please follow ice machine instructions and tips below.    Polar Care Cold Therapy Machine Recommendations    Cold therapy devices can be used before and after surgery to assist in comfort and help to reduce pain and swelling.  These devices differ from ice or ice packs whereas the mechanism circulates water through tubing and a pad to provide longer periods of cold therapy to the  desired site.  While in the hospital, you can use your cold devices around the clock for optimal comfort.  We recommend using cold therapy after working with therapy or completing exercises on your own.  Once you are discharged home, there is no set schedule in which you must follow while using cold therapy.  Below are a few points to remember when using a cold therapy device:    Read the 's instructions prior to first the use.  Follow instructions for filling the cooler (water first, then ice).  Always make sure there is a layer of protection between the cold pad and your skin (Clothing, Towel, Ace Bandage, etc.)  Allow the device to circulate cold water throughout the pad prior wrapping the pad around your leg (approximately 10 minutes).  Place the pad on your leg in the desired position to meet your pain management needs and use the wraps provided to secure the pad to your body.  The purpose of this device is to use consistently throughout the day.  You do not need to need to use the 20 on, 20 off method.  During waking hours, remove the cold pad every 1-2 hours to perform a skin check  Detach the pad from the cooler and ambulate at least once every hour  After removing the pad, allow at least 30 minutes before resuming cold therapy  You may wear the cold therapy device during periods of sleep including overnight    If you wake up during the night, you can check the skin at this time.  You do not need to wake up specifically to perform skin checks.  Empty the cooler and pad when device is not in use.  Follow 's instructions for cleaning your cold therapy device.    [facility] can assist with problems related to products purchased through the hospital  [phone] - Angelica   or   [phone] - Maria R

## 2023-12-12 NOTE — OP NOTE
Left Hip Total Arthroplasty ~ Posterior Approach (L) Operative Note     Date: 2023  OR Location: [facility] A OR    Name: Maryjane Wallis, : 1970, Age: 53 y.o., MRN: [id number], Sex: female    Diagnosis  Pre-op Diagnosis     * Primary osteoarthritis of left hip [M16.12] Post-op Diagnosis     * Primary osteoarthritis of left hip [M16.12]     Procedures  Left Hip Total Arthroplasty ~ Posterior Approach  88906 - DE ARTHRP ACETBLR/PROX FEM PROSTC AGRFT/ALGRFT      Surgeons      * Charles William - Primary    Resident/Fellow/Other Assistant:  Surgeon(s) and Role:     * Lv Luna MD - Resident - Assisting    Procedure Summary  Anesthesia: Spinal  ASA: II  Anesthesia Staff: Anesthesiologist: Zina Hare MD  CRNA: JOSÉ Glez-CRNA  Estimated Blood Loss: 50mL  Intra-op Medications:   Medication Name Total Dose   sodium chloride 0.9 % irrigation solution 4,000 mL              Anesthesia Record               Intraprocedure I/O Totals          Intake    Propofol Drip 0.00 mL    The total shown is the total volume documented since Anesthesia Start was filed.    Total Intake 0 mL       Output    Est. Blood Loss 50 mL    Total Output 50 mL       Net    Net Volume -50 mL          Specimen: No specimens collected     Staff:   Relief Circulator: Abby Kate RN; Roya Funk RN  Scrub Person: Rom Savage RN         Drains and/or Catheters: * None in log *    Tourniquet Times:         Implants:  Implants       Type Name Action Serial No.      Joint Hip ACETABULAR CUP, SECTOR, GRIPTON, SIZE 52MM - [id number] Implanted      Joint Hip LINER, ALTRX, NEURTAL, 36 X 52MM - [id number] Implanted      Joint Hip FEMORAL HEAD, CERAMIC 36 +5 - SNA - [id number] Implanted NA              Findings: endstage OA L hip    Indications: Maryjane Wallis is an 53 y.o. female who is having surgery for Primary osteoarthritis of left hip [M16.12]. End stage OA L hip    The patient was seen in the preoperative area. The risks, benefits,  complications, treatment options, non-operative alternatives, expected recovery and outcomes were discussed with the patient. The possibilities of reaction to medication, pulmonary aspiration, injury to surrounding structures, bleeding, recurrent infection, the need for additional procedures, failure to diagnose a condition, and creating a complication requiring transfusion or operation were discussed with the patient. The patient concurred with the proposed plan, giving informed consent.  The site of surgery was properly noted/marked if necessary per policy. The patient has been actively warmed in preoperative area. Preoperative antibiotics are not indicated. Venous thrombosis prophylaxis are not indicated.    Procedure Details:   Postop DX: Left Hip arthritis  Postop DX: Same   Procedure:  Left Total hip arthroplasty  Surgeon: Charles William MD  Asst : Sid Luna MD   Anesthesia: spinal   Implants: Actis size 2 femoral stem.  +5 femoral neck.  36 mm ceramic head.  52 mm [place] cup.  Standard liner.  Clinical Note: 53 year-old female with end-stage arthrosis of hip here for total hip arthroplasty.  Discussed risk of surgery including but not limited to dislocation, bleeding, infection, fracture, DVT, instability, possible need for further surgery.  Understood these risks wished proceed.  Procedure Note: Patient brought to operating room.  Timeout performed. Antibiotics given IV.  TXA 1 gm given IV.  Spinal anesthesia given.  Transferred to the OR table.  Placed in the lateral decubitus position with the operative hip up.  All bony problems were padded well.  Axillary roll was placed.  Hip was prepped and draped typical sterile fashion.  Posterior approach to the hip was performed.  Incision through skin down to gluteal fascia.  External rotators and capsule elevated off the femoral neck and tagged.  Hip was dislocated.  Femoral neck osteotomy was performed.  Periacetabular retractors were placed.  Labrum was  excised.  Acetabulum was reamed up to 51 mm,  Trial fit nicely.  52 mm Acetabular cup was impacted in place with good fixation.  Standard polyethylene liner was used.  Femur was broached using the takokatis femoral implant system.  Broached to stable size 2 trial stem  with good fixation in the bone.  Reduced with a +5 mm neck.    Equal leg lengths.  Trial components removed.  Femoral stem was impacted in place with good fixation.  Reduced with 36 mm ceramic head.  Good flexibility and stability.  Hip was irrigated.  Capsule and external rotators repaired through drill holes.  Wounds closed in layers.  Soft compressive dressing applied. Tolerated procedure well.  Taken recovery in stable condition.      Complications:  None; patient tolerated the procedure well.    Disposition: PACU - hemodynamically stable.  Condition: stable         Additional Details: none    Attending Attestation: I was present and scrubbed for the entire procedure.    Charles William  Phone Number: [phone]

## 2023-12-12 NOTE — ANESTHESIA PROCEDURE NOTES
Spinal Block    Patient location during procedure: OR  Start time: 12/12/2023 11:18 AM  End time: 12/12/2023 11:23 AM  Reason for block: primary anesthetic and at surgeon's request  Staffing  Performed: CRNA   Authorized by: Zina Hare MD    Performed by: BOBBI Glez    Preanesthetic Checklist  Completed: patient identified, IV checked, risks and benefits discussed, surgical consent, pre-op evaluation, timeout performed and sterile techniques followed  Block Timeout  RN/Licensed healthcare professional reads aloud to the Anesthesia provider and entire team: Patient identity, procedure with side and site, patient position, and as applicable the availability of implants/special equipment/special requirements.  Patient on coagulant treatment: no  Timeout performed at: 12/12/2023 11:18 AM  Spinal Block  Patient position: sitting  Prep: Betadine  Sterility prep: cap, drape, gloves, hand hygiene and mask  Sedation level: light sedation  Patient monitoring: blood pressure and continuous pulse oximetry  Approach: midline  Vertebral space: L3-4  Injection technique: single-shot  Needle  Needle type: pencil-point   Needle gauge: 25 G  Needle length: 3.5 in  Free flowing CSF: yes    Assessment  Sensory level: T10 bilateral  Block outcome: patient comfortable  Procedure assessment: patient sedated but conversant throughout procedure

## 2023-12-12 NOTE — PROGRESS NOTES
Physical Therapy    Physical Therapy Evaluation & Treatment    Patient Name: Maryjane Wallis  MRN: [id number]  Today's Date: 12/12/2023   Time Calculation  Start Time: 1600  Stop Time: 1627  Time Calculation (min): 27 min    Assessment/Plan   PT Assessment  PT Assessment Results: Decreased strength, Decreased range of motion, Impaired balance, Decreased mobility, Orthopedic restrictions  Rehab Prognosis: Excellent  Evaluation/Treatment Tolerance: Patient tolerated treatment well  Medical Staff Made Aware: Yes  End of Session Communication: Bedside nurse  Assessment Comment: PT eval completed, and pt is safe for d/c home. She has met all criteria.  End of Session Patient Position: Up in chair   IP OR SWING BED PT PLAN  Inpatient or Swing Bed: Inpatient  PT Plan  PT Plan: PT Eval only  PT Eval Only Reason: Safe to return home  PT Frequency: PT eval only  PT Discharge Recommendations: Low intensity level of continued care  PT - OK to Discharge: Yes      Subjective     General Visit Information:  General  Reason for Referral: Pt seen POD #0 s/p L THR.  Past Medical History Relevant to Rehab: R TKR, HTN, HPL, OA, B CTR  Family/Caregiver Present: Yes  Caregiver Feedback: Spouse present and supportive  Prior to Session Communication: Bedside nurse  Patient Position Received: On cart (sitting EOB with OT)  Preferred Learning Style: verbal  General Comment: Pt is pleasant and cooperative, doing well with PT.  Home Living:  Home Living  Type of Home: House  Lives With: Spouse  Home Adaptive Equipment: Walker rolling or standard, Cane  Home Layout: One level  Home Access: Level entry  Prior Level of Function:  Prior Function Per Pt/Caregiver Report  Level of [place]: Independent with homemaking with ambulation  ADL Assistance: Independent  Homemaking Assistance: Independent  Ambulatory Assistance: Independent (with cane)  Precautions:  Precautions  Post-Surgical Precautions: Left hip precautions  Precautions Comment: Pt able  to recall 3/3 THR precautions.      Objective   Pain:  Pain Assessment  Pain Assessment: 0-10  Pain Score: 1  Pain Type: Acute pain  Pain Location: Hip  Pain Orientation: Left  Cognition:  Cognition  Overall Cognitive Status: Within Functional Limits    General Assessments:      Static Sitting Balance  Static Sitting-Balance Support: Feet supported, No upper extremity supported  Static Sitting-Level of Assistance: Independent  Dynamic Sitting Balance  Dynamic Sitting-Balance Support: Feet supported, No upper extremity supported  Dynamic Sitting-Balance: Forward lean  Dynamic Sitting-Comments: indep    Static Standing Balance  Static Standing-Balance Support: Bilateral upper extremity supported  Static Standing-Level of Assistance: Modified independent  Dynamic Standing Balance  Dynamic Standing-Balance Support: No upper extremity supported  Dynamic Standing-Balance: Reaching for objects  Dynamic Standing-Comments: indep  Functional Assessments:  Transfers  Transfer: Yes  Transfer 1  Technique 1: Sit to stand, Stand to sit  Transfer Device 1: Walker  Transfer Level of Assistance 1: Distant supervision  Trials/Comments 1: Sit to Stand- Education on correct technique with sit to standing with emphasis on scooting towards the edge of the surface, bringing non-operative LE back while keeping the operative LE forward, and leaning slightly forward while coming up to a standing position.   Stand to Sit- Pt encouraged to feel for the surface of the chair on the posterior aspect of the legs prior to sit down, bring the operative leg forward while reaching back to the surface he/she is about to sit on for safety.  Extremity/Trunk Assessments:  RLE   RLE : Within Functional Limits  LLE   LLE : Exceptions to WFL  Strength LLE  LLE Overall Strength: Greater than or equal to 3/5 as evidenced by functional mobility  Treatments:  Therapeutic Exercise  Therapeutic Exercise Performed: Yes  Therapeutic Exercise Activity 1: Pt OOB  before and after, so reviewed supine TE with pt. Pt did complete Ankle Pumps- x 10 reps bilaterally with cueing for full AROM and slow pace    Glut Sets- x 10 reps with cueing for technique/hold time of 3-5 seconds      LAQ- x 10 reps LLE without assist with cues to avoid lifting upper thigh off surface    Therapeutic Activity  Therapeutic Activity Performed: Yes  Therapeutic Activity 1: Pt performed mini-squat while toileting, using L grab bar for balance. Pt did not want to sit on low toilet seat. Able to complete balance activity with distant supervision x 1 minute.    Ambulation/Gait Training  Ambulation/Gait Training Performed: Yes  Ambulation/Gait Training 1  Surface 1: Level tile  Device 1: Rolling walker  Assistance 1: Modified independent  Quality of Gait 1:  (Initially amb with step-to pattern, but able to progress to step-through gait pattern by end of session.)  Comments/Distance (ft) 1: 75' x 2  Stairs  Stairs: Yes  Stairs  Rails 1: Bilateral  Device 1: Railing  Assistance 1: Modified independent  Comment/Number of Steps 1: 4  Outcome Measures:  [facility] Basic Mobility  Turning from your back to your side while in a flat bed without using bedrails: A little  Moving from lying on your back to sitting on the side of a flat bed without using bedrails: A little  Moving to and from bed to chair (including a wheelchair): None  Standing up from a chair using your arms (e.g. wheelchair or bedside chair): None  To walk in hospital room: None  Climbing 3-5 steps with railing: None  Basic Mobility - Total Score: 22        Education Documentation  Handouts, taught by Kay Huff PT at 12/12/2023  4:53 PM.  Learner: Significant Other, Patient  Readiness: Acceptance  Method: Explanation, Demonstration, Handout  Response: Verbalizes Understanding, Demonstrated Understanding    Precautions, taught by Kay Huff PT at 12/12/2023  4:53 PM.  Learner: Significant Other, Patient  Readiness: Acceptance  Method:  Explanation, Demonstration, Handout  Response: Verbalizes Understanding, Demonstrated Understanding    Body Mechanics, taught by Kay Huff, PT at 12/12/2023  4:53 PM.  Learner: Significant Other, Patient  Readiness: Acceptance  Method: Explanation, Demonstration, Handout  Response: Verbalizes Understanding, Demonstrated Understanding    Home Exercise Program, taught by Kay Huff, PT at 12/12/2023  4:53 PM.  Learner: Significant Other, Patient  Readiness: Acceptance  Method: Explanation, Demonstration, Handout  Response: Verbalizes Understanding, Demonstrated Understanding    Mobility Training, taught by Kay Huff, PT at 12/12/2023  4:53 PM.  Learner: Significant Other, Patient  Readiness: Acceptance  Method: Explanation, Demonstration, Handout  Response: Verbalizes Understanding, Demonstrated Understanding    Education Comments  No comments found.

## 2023-12-12 NOTE — ANESTHESIA PREPROCEDURE EVALUATION
Patient: Maryjane Wallis    Procedure Information       Date/Time: 12/12/23 1030    Procedure: Left Hip Total Arthroplasty ~ Posterior Approach (Left: Hip)    Location: [facility] A OR 17 / [facility] A OR    Surgeons: Charles William MD                                                    Pre-Anesthesia Evaluation      Maryjane aWllis is a 53 y.o. female who presents for procedure stated above. Patient endorses no changes in health or medications since PAT visit.       Medical History reviewed  She has a past medical history of Acute idiopathic thrombocytopenic purpura (CMS/HCC) (2007), Carpal tunnel syndrome of left wrist, Hyperlipidemia, Hypertension, OA (osteoarthritis), Obesity, and Prediabetes.    Past Surgical History:   Procedure Laterality Date    CARPAL TUNNEL RELEASE Bilateral     Neuroplasty Decompression Median Nerve At Carpal Tunnel    HYSTERECTOMY  2013    TOTAL KNEE ARTHROPLASTY Right 2022    TUBAL LIGATION  2004    reviewed    Social History reviewed  She reports that she has never smoked. She has never used smokeless tobacco. She reports current alcohol use of about 1.0 standard drink of alcohol per week. She reports that she does not use drugs.    Allergies and Medications reviewed  Patient has no known allergies.    Current Outpatient Medications   Medication Instructions    acetaminophen (Tylenol 8 HOUR) 650 mg ER tablet 2 tablets, oral, Every 8 hours PRN    amLODIPine (Norvasc) 5 mg tablet TAKE 1 TABLET BY MOUTH EVERY DAY    atorvastatin (Lipitor) 40 mg tablet TAKE 1 TABLET BY MOUTH EVERY DAY    chlorhexidine (Peridex) 0.12 % solution Swish for 30 seconds and spit 15mL of solution the night before and morning of surgery    DIETARY SUPPLEMENT ORAL 1 Dose, oral, Nightly, PROBIOTIC     multivit,calc,mins/iron/folic (WOMEN'S ONE DAILY ORAL) oral, TABS           Relevant Results reviewed  Lab Results   Component Value Date    STAPHMRSASCR No Staphylococcus aureus isolated 12/08/2023     No results found for:  "\"ABO\"  Lab Results   Component Value Date    WBC 7.6 12/08/2023    HGB 12.8 12/08/2023    HCT 38.9 12/08/2023     (L) 12/12/2023       Chemistry    Lab Results   Component Value Date/Time     12/08/2023 1402    K 3.9 12/08/2023 1402     12/08/2023 1402    CO2 25 12/08/2023 1402    BUN 8 12/08/2023 1402    CREATININE 0.56 12/08/2023 1402    Lab Results   Component Value Date/Time    CALCIUM 9.6 12/08/2023 1402    ALKPHOS 90 07/24/2023 0736    AST 17 07/24/2023 0736    ALT 18 07/24/2023 0736    BILITOT 0.4 07/24/2023 0736          No results found for: \"PROTIME\", \"INR\", \"PTT\"  Encounter Date: 12/08/23   ECG 12 lead (Clinic Performed)   Result Value    Ventricular Rate 68    Atrial Rate 68    IA Interval 174    QRS Duration 98    QT Interval 416    QTC Calculation(Bazett) 442    P Axis 43    R Axis -4    T Axis 22    QRS Count 11    Q Onset 225    P Onset 138    P Offset 194    T Offset 433    QTC Fredericia 434    Narrative    Normal sinus rhythm  Incomplete right bundle branch block  Nonspecific T wave abnormality  Abnormal ECG  When compared with ECG of 27-SEP-2022 08:12,  Nonspecific T wave abnormality now evident in Anterior leads  Confirmed by Cory Bolden ([id number]) on 12/8/2023 4:32:31 PM     No results found for this or any previous visit.  No results found for this or any previous visit.  No results found for this or any previous visit from the past 1000 days.     No image results found.    Vitals  Visit Vitals  BP (!) 142/96   Pulse 87   Temp 36 °C (96.8 °F)   Resp 18   Ht 1.62 m (5' 3.78\")   Wt 101 kg (222 lb 14.2 oz)   SpO2 100%   BMI 38.52 kg/m²   Smoking Status Never   BSA 2.13 m²                          Relevant Problems   Anesthesia (within normal limits)      Cardiovascular   (+) Essential hypertension   (+) Hyperlipidemia   (+) Hyperlipidemia LDL goal <130      Endocrine   (+) Obesity      /Renal   (+) Urinary tract infection (Resolved)      Neuro/Psych   (+) Carpal tunnel syndrome " of left wrist   (+) Lesion of left ulnar nerve      Musculoskeletal   (+) Carpal tunnel syndrome of left wrist   (+) Osteoarthritis of knees, bilateral   (+) Primary localized osteoarthritis of hip   (+) Primary localized osteoarthritis of knee   (+) Primary localized osteoarthritis of right knee   (+) Primary osteoarthritis of left hip      Infectious Disease   (+) Urinary tract infection (Resolved)      Other   (+) Axillary adenopathy (ITP)       Clinical information reviewed:    Allergies  Meds  Problems              NPO Detail:  NPO/Void Status  Date of Last Liquid: 12/12/23  Time of Last Liquid: 0600  Date of Last Solid: 12/11/23  Time of Last Solid: 1800         Physical Exam    Airway  Mallampati: II  TM distance: >3 FB  Neck ROM: full     Cardiovascular - normal exam     Dental - normal exam     Pulmonary - normal exam     Abdominal        Stable platelet count.    Anesthesia Plan    ASA 2     spinal     intravenous induction   Anesthetic plan and risks discussed with patient.  Use of blood products discussed with patient who consented to blood products.    Plan discussed with CRNA and CAA.

## 2023-12-12 NOTE — PROGRESS NOTES
Occupational Therapy    Evaluation/Treatment and Discharge     Patient Name: Maryjane Wallis  MRN: [id number]  : 1970  Today's Date: 23  Time Calculation  Start Time:   Stop Time: 161  Time Calculation (min): 30 min       Assessment:  OT Assessment: Pt seen in PACU POD #0 L TAWANA. Pt demos decreased L LE ROM/strength and increased pain resulting in decreased safety & independence with ADLs/IADLs. Pt to D/C from PACU this date home with assist of spouse for ADLs/IADLs.  End of Session Communication: Bedside nurse  End of Session Patient Position:  (pt standing in room with PT for PT session)  OT Assessment Results: Decreased ADL status, Decreased IADLs  Strengths: Attitude of self, Ability to acquire knowledge, Support of Caregivers, Premorbid level of function  Plan:  No Skilled OT: No acute OT goals identified (safe to return home; pt to D/C home this date from PACU)  OT Discharge Recommendations: No further acute OT, No OT needed after discharge (pt to D/C from PACU)  OT - OK to Discharge: Yes (pt to D/C from PACU)       Subjective     General:   OT Received On: 23  General  Reason for Referral: Pt is POD #0 L TAWANA.  Referred By: Stewart CHIN)  Past Medical History Relevant to Rehab: carpal tunnel (L wrist), HTN, HLD, OA, prediabtes, Acute idiopathic thrombocytopenic purpura, B carpal tunnel release, R TKA ()  Family/Caregiver Present: Yes  Caregiver Feedback: pt's spouse present for session  Co-Treatment:  (PT beginning session at end of OT session)  Prior to Session Communication: Bedside nurse  Patient Position Received: On cart  General Comment: Pt supine in bed upon arrival and agreeable to OT eval/tx. Pt fully participatory in session.  Precautions:  LE Weight Bearing Status: Weight Bearing as Tolerated (L LE)  Medical Precautions: Fall precautions  Post-Surgical Precautions: Left hip precautions       Pain:  Pain Assessment  Pain Assessment: 0-10  Pain Score: 3  Pain Type: Surgical  pain, Acute pain  Pain Location: Hip  Pain Orientation: Left    Objective   Cognition:  Overall Cognitive Status: Within Functional Limits  Orientation Level: Oriented X4           Home Living:  Type of Home: House  Lives With: Spouse  Home Adaptive Equipment: Walker rolling or standard, Cane (hip kit)  Home Layout: One level  Home Access: No concerns  Bathroom Shower/Tub: Walk-in shower  Bathroom Toilet: Handicapped height  Bathroom Equipment: Shower chair with back  Prior Function:  Level of [place]: Independent with ADLs and functional transfers, Independent with homemaking with ambulation  Receives Help From:  (Spouse assists PRN with LB dressing and shares IADLs)  ADL Assistance: Independent (spouse assists with footwear)  Homemaking Assistance: Independent  Ambulatory Assistance: Independent  Prior Function Comments: + driving. - falls.    ADL:  UE Dressing Deficit: Setup (doff hospital gown, to don bra and pullover garment)  LE Dressing Assistance: Minimal (to don undergarment and pants; Max A to don shoes d/t tight fit, Min A to doff socks)  LE Dressing Deficit: Don/doff R sock, Don/doff L sock, Thread LLE into pants, Thread LLE into underwear, Verbal cueing, Supervision/safety, Increased time to complete, Use of adaptive equipment  ADL Comments: cues for correct use of AE and mi dressing technique to maintain hip precautions  Activities of Daily Living: UE Dressing  UE Dressing Level of Assistance: Setup  UE Dressing Where Assessed: Edge of bed  UE Dressing Comments: pt doffed gown, donned bra and pullover garment    LE Dressing  LE Dressing: Yes  LE Dressing Adaptive Equipment: Reacher, Sock aide, Dressing stick, Shoe horn  Pants Level of Assistance: Minimum assistance, Minimal verbal cues  Sock Level of Assistance: Minimum assistance, Minimal verbal cues  Shoe Level of Assistance: Maximum assistance, Minimal verbal cues  LE Dressing Where Assessed: Edge of bed (standing using FWW)  LE Dressing  Comments: min cues for sequencing, mi dressing technique and correct use of AE to adhere to L hip precautions  Activity Tolerance:  Endurance: Endurance does not limit participation in activity       Bed Mobility/Transfers: Bed Mobility  Bed Mobility: Yes  Bed Mobility 1  Bed Mobility 1: Supine to sitting  Level of Assistance 1: Minimum assistance, Minimal verbal cues  Bed Mobility Comments 1: on L LE, cues for body positioning to adhere to L hip precautions    Transfers  Transfer: Yes  Transfer 1  Technique 1: Sit to stand, Stand to sit  Transfer Device 1: Walker  Transfer Level of Assistance 1: Close supervision, Minimal verbal cues  Trials/Comments 1: cues for sequencing, safe hand placement, L LE start position and walker safety, from EOB    Sitting Balance:  Static Sitting Balance  Static Sitting-Balance Support: Bilateral upper extremity supported  Static Sitting-Level of Assistance: Independent  Standing Balance:  Static Standing Balance  Static Standing-Balance Support: Bilateral upper extremity supported  Static Standing-Level of Assistance: Close supervision  Static Standing-Comment/Number of Minutes: using FWW  Dynamic Standing Balance  Dynamic Standing-Balance Support: No upper extremity supported  Dynamic Standing-Comments: SBA while managing pants up over hips    Sensation:  Light Touch: No apparent deficits  Strength:  Strength Comments: CAROLE UEs WFL       Coordination:  Movements are Fluid and Coordinated: Yes        Extremities: RUE   RUE : Within Functional Limits and LUE   LUE: Within Functional Limits      Outcome Measures: [facility] Daily Activity  Putting on and taking off regular lower body clothing: A lot  Bathing (including washing, rinsing, drying): A little  Putting on and taking off regular upper body clothing: A little  Toileting, which includes using toilet, bedpan or urinal: A little  Taking care of personal grooming such as brushing teeth: None  Eating Meals: None  Daily Activity - Total  Score: 19        Education Documentation  Handouts, taught by Gwen Lazar OT at 12/12/2023  4:50 PM.  Learner: Significant Other, Patient  Readiness: Acceptance  Method: Explanation, Demonstration, Handout  Response: Verbalizes Understanding, Demonstrated Understanding, Needs Reinforcement  Comment: pt provided with OT THR packet, educated on LB ADL AE, demos good understanding of hip precautions and correct use of AE    Body Mechanics, taught by Gwen Lazar OT at 12/12/2023  4:50 PM.  Learner: Significant Other, Patient  Readiness: Acceptance  Method: Explanation, Demonstration, Handout  Response: Verbalizes Understanding, Demonstrated Understanding, Needs Reinforcement  Comment: pt provided with OT THR packet, educated on LB ADL AE, demos good understanding of hip precautions and correct use of AE    Precautions, taught by Gwen Lazar OT at 12/12/2023  4:50 PM.  Learner: Significant Other, Patient  Readiness: Acceptance  Method: Explanation, Demonstration, Handout  Response: Verbalizes Understanding, Demonstrated Understanding, Needs Reinforcement  Comment: pt provided with OT THR packet, educated on LB ADL AE, demos good understanding of hip precautions and correct use of AE    ADL Training, taught by Gwen Lazar OT at 12/12/2023  4:50 PM.  Learner: Significant Other, Patient  Readiness: Acceptance  Method: Explanation, Demonstration, Handout  Response: Verbalizes Understanding, Demonstrated Understanding, Needs Reinforcement  Comment: pt provided with OT THR packet, educated on LB ADL AE, demos good understanding of hip precautions and correct use of AE    Education Comments  No comments found.

## 2023-12-14 ENCOUNTER — HOME CARE VISIT (OUTPATIENT)
Dept: HOME HEALTH SERVICES | Facility: HOME HEALTH | Age: 53
End: 2023-12-14
Payer: COMMERCIAL

## 2023-12-14 VITALS
HEART RATE: 81 BPM | TEMPERATURE: 98.2 F | BODY MASS INDEX: 37.56 KG/M2 | SYSTOLIC BLOOD PRESSURE: 131 MMHG | WEIGHT: 220 LBS | DIASTOLIC BLOOD PRESSURE: 70 MMHG | HEIGHT: 64 IN | RESPIRATION RATE: 18 BRPM | OXYGEN SATURATION: 98 %

## 2023-12-14 PROCEDURE — 0023 HH SOC

## 2023-12-14 PROCEDURE — G0151 HHCP-SERV OF PT,EA 15 MIN: HCPCS

## 2023-12-14 ASSESSMENT — ENCOUNTER SYMPTOMS
PAIN LOCATION: LEFT BUTTOCK
OCCASIONAL FEELINGS OF UNSTEADINESS: 1
PAIN: 1
HIGHEST PAIN SEVERITY IN PAST 24 HOURS: 9/10
PERSON REPORTING PAIN: PATIENT
LOWEST PAIN SEVERITY IN PAST 24 HOURS: 3/10
MUSCLE WEAKNESS: 1
LOWER EXTREMITY EDEMA: 1
SUBJECTIVE PAIN PROGRESSION: UNCHANGED
HYPERTENSION: 1
PAIN LOCATION: LEFT HIP
PAIN SEVERITY GOAL: 0/10

## 2023-12-14 ASSESSMENT — PAIN SCALES - PAIN ASSESSMENT IN ADVANCED DEMENTIA (PAINAD)
BODYLANGUAGE: 0
BREATHING: 0
BODYLANGUAGE: 0 - RELAXED.
NEGVOCALIZATION: 0 - NONE.
CONSOLABILITY: 0
CONSOLABILITY: 0 - NO NEED TO CONSOLE.
NEGVOCALIZATION: 0

## 2023-12-14 ASSESSMENT — ACTIVITIES OF DAILY LIVING (ADL)
ENTERING_EXITING_HOME: CONTACT GUARD ASSIST
CURRENT_FUNCTION: ONE PERSON
OASIS_M1830: 03
AMBULATION ASSISTANCE: 1
PHYSICAL TRANSFERS ASSESSED: 1
AMBULATION ASSISTANCE: ONE PERSON

## 2023-12-14 NOTE — ANESTHESIA POSTPROCEDURE EVALUATION
Patient: Maryjane Wallis    Procedure Summary       Date: 12/12/23 Room / Location: [facility] OR 17 / [facility] OR    Anesthesia Start: 1109 Anesthesia Stop: 1326    Procedure: Left Hip Total Arthroplasty ~ Posterior Approach (Left: Hip) Diagnosis:       Primary osteoarthritis of left hip      (Primary osteoarthritis of left hip [M16.12])    Surgeons: Charles William MD Responsible Provider: Zina Hare MD    Anesthesia Type: spinal ASA Status: 2            Anesthesia Type: spinal    Vitals Value Taken Time   /64 12/12/23 1714   Temp 36.4 °C (97.5 °F) 12/12/23 1318   Pulse 65 12/12/23 1714   Resp 16 12/12/23 1714   SpO2 99 % 12/12/23 1714       Anesthesia Post Evaluation    Patient location during evaluation: bedside  Patient participation: complete - patient participated  Level of consciousness: awake  Pain management: satisfactory to patient  Multimodal analgesia pain management approach  Airway patency: patent  Cardiovascular status: stable  Respiratory status: spontaneous ventilation and unassisted  Hydration status: acceptable  Postoperative Nausea and Vomiting: none and mild        There were no known notable events for this encounter.

## 2023-12-15 ENCOUNTER — HOME CARE VISIT (OUTPATIENT)
Dept: HOME HEALTH SERVICES | Facility: HOME HEALTH | Age: 53
End: 2023-12-15
Payer: COMMERCIAL

## 2023-12-15 VITALS
RESPIRATION RATE: 16 BRPM | SYSTOLIC BLOOD PRESSURE: 135 MMHG | HEART RATE: 72 BPM | TEMPERATURE: 97.5 F | DIASTOLIC BLOOD PRESSURE: 82 MMHG

## 2023-12-15 PROCEDURE — G0157 HHC PT ASSISTANT EA 15: HCPCS

## 2023-12-15 SDOH — HEALTH STABILITY: PHYSICAL HEALTH: PHYSICAL EXERCISE: 10

## 2023-12-15 SDOH — HEALTH STABILITY: PHYSICAL HEALTH: PHYSICAL EXERCISE: 15

## 2023-12-15 SDOH — HEALTH STABILITY: PHYSICAL HEALTH: PHYSICAL EXERCISE: SUPINE

## 2023-12-15 SDOH — HEALTH STABILITY: PHYSICAL HEALTH: EXERCISE ACTIVITY: GLUTE SETS

## 2023-12-15 SDOH — HEALTH STABILITY: PHYSICAL HEALTH: EXERCISE ACTIVITY: HEEL SLIDES

## 2023-12-15 SDOH — HEALTH STABILITY: PHYSICAL HEALTH: EXERCISE ACTIVITY: SHORT ARC QUADS

## 2023-12-15 SDOH — HEALTH STABILITY: PHYSICAL HEALTH: EXERCISE ACTIVITY: QUAD SETS

## 2023-12-15 SDOH — HEALTH STABILITY: PHYSICAL HEALTH: EXERCISE ACTIVITY: HIP ABDUCTION

## 2023-12-15 SDOH — HEALTH STABILITY: PHYSICAL HEALTH: EXERCISE ACTIVITY: ANKLE PUMPS

## 2023-12-15 SDOH — HEALTH STABILITY: PHYSICAL HEALTH: EXERCISE TYPE: POST THR EXERCISE PROGRAM

## 2023-12-15 SDOH — HEALTH STABILITY: PHYSICAL HEALTH: EXERCISE ACTIVITY: LONG ARC QUADS

## 2023-12-15 SDOH — HEALTH STABILITY: PHYSICAL HEALTH: PHYSICAL EXERCISE: SEATED

## 2023-12-15 ASSESSMENT — ENCOUNTER SYMPTOMS
PAIN: 1
PAIN LOCATION - PAIN QUALITY: ACHING, THROBBING
PERSON REPORTING PAIN: PATIENT
PAIN LOCATION: LEFT HIP
PAIN LOCATION - EXACERBATING FACTORS: ACTIVITY, TIME OF DAY
PAIN LOCATION - PAIN FREQUENCY: FREQUENT
HIGHEST PAIN SEVERITY IN PAST 24 HOURS: 6/10
LOWEST PAIN SEVERITY IN PAST 24 HOURS: 1/10
PAIN SEVERITY GOAL: 0/10
SUBJECTIVE PAIN PROGRESSION: WAXING AND WANING
PAIN LOCATION - PAIN SEVERITY: 1/10
PAIN LOCATION - RELIEVING FACTORS: MEDICATION, ICE

## 2023-12-18 ENCOUNTER — HOME CARE VISIT (OUTPATIENT)
Dept: HOME HEALTH SERVICES | Facility: HOME HEALTH | Age: 53
End: 2023-12-18

## 2023-12-18 VITALS
RESPIRATION RATE: 16 BRPM | HEART RATE: 98 BPM | TEMPERATURE: 75 F | DIASTOLIC BLOOD PRESSURE: 76 MMHG | SYSTOLIC BLOOD PRESSURE: 116 MMHG

## 2023-12-18 PROCEDURE — G0157 HHC PT ASSISTANT EA 15: HCPCS

## 2023-12-18 SDOH — HEALTH STABILITY: PHYSICAL HEALTH: PHYSICAL EXERCISE: 20

## 2023-12-18 SDOH — HEALTH STABILITY: PHYSICAL HEALTH: PHYSICAL EXERCISE: SUPINE

## 2023-12-18 SDOH — HEALTH STABILITY: PHYSICAL HEALTH: EXERCISE ACTIVITY: HIP ABDUCTION

## 2023-12-18 SDOH — HEALTH STABILITY: PHYSICAL HEALTH: PHYSICAL EXERCISE: STANDING

## 2023-12-18 SDOH — HEALTH STABILITY: PHYSICAL HEALTH: PHYSICAL EXERCISE: 10

## 2023-12-18 SDOH — HEALTH STABILITY: PHYSICAL HEALTH: EXERCISE ACTIVITY: LONG ARC QUADS, LEFT LE

## 2023-12-18 SDOH — HEALTH STABILITY: PHYSICAL HEALTH: EXERCISE ACTIVITY: HIP ABDUCTION, BILATERAL

## 2023-12-18 SDOH — HEALTH STABILITY: PHYSICAL HEALTH: EXERCISE ACTIVITY: HEEL SLIDES

## 2023-12-18 SDOH — HEALTH STABILITY: PHYSICAL HEALTH: PHYSICAL EXERCISE: SEATED

## 2023-12-18 SDOH — HEALTH STABILITY: PHYSICAL HEALTH: EXERCISE ACTIVITY: QUAD SETS

## 2023-12-18 SDOH — HEALTH STABILITY: PHYSICAL HEALTH: EXERCISE ACTIVITY: HEEL RAISES

## 2023-12-18 SDOH — HEALTH STABILITY: PHYSICAL HEALTH: EXERCISE ACTIVITY: ANKLE PUMPS

## 2023-12-18 SDOH — HEALTH STABILITY: PHYSICAL HEALTH: EXERCISE ACTIVITY: SHORT ARC QUADS

## 2023-12-18 SDOH — HEALTH STABILITY: PHYSICAL HEALTH: EXERCISE ACTIVITY: GLUTE SETS

## 2023-12-18 SDOH — HEALTH STABILITY: PHYSICAL HEALTH: EXERCISE ACTIVITY: KNEE FLEXION, BILATERAL

## 2023-12-18 ASSESSMENT — ENCOUNTER SYMPTOMS
PAIN LOCATION - PAIN SEVERITY: 2/10
PAIN LOCATION - EXACERBATING FACTORS: ACTIVITY, TIME OF DAY
HIGHEST PAIN SEVERITY IN PAST 24 HOURS: 2/10
PAIN LOCATION - PAIN FREQUENCY: INTERMITTENT
PAIN LOCATION - PAIN QUALITY: ACHING
LOWEST PAIN SEVERITY IN PAST 24 HOURS: 0/10
PERSON REPORTING PAIN: PATIENT
PAIN SEVERITY GOAL: 0/10
PAIN LOCATION: LEFT HIP
PAIN: 1
SUBJECTIVE PAIN PROGRESSION: GRADUALLY IMPROVING

## 2023-12-21 ENCOUNTER — HOME CARE VISIT (OUTPATIENT)
Dept: HOME HEALTH SERVICES | Facility: HOME HEALTH | Age: 53
End: 2023-12-21
Payer: COMMERCIAL

## 2023-12-21 VITALS
DIASTOLIC BLOOD PRESSURE: 80 MMHG | TEMPERATURE: 97.7 F | RESPIRATION RATE: 16 BRPM | HEART RATE: 74 BPM | SYSTOLIC BLOOD PRESSURE: 130 MMHG

## 2023-12-21 PROCEDURE — G0157 HHC PT ASSISTANT EA 15: HCPCS

## 2023-12-21 SDOH — HEALTH STABILITY: PHYSICAL HEALTH: EXERCISE ACTIVITY: MARCHING, BILATERAL ALTERNATING

## 2023-12-21 SDOH — HEALTH STABILITY: PHYSICAL HEALTH: PHYSICAL EXERCISE: STANDING

## 2023-12-21 SDOH — HEALTH STABILITY: PHYSICAL HEALTH: PHYSICAL EXERCISE: 20

## 2023-12-21 SDOH — HEALTH STABILITY: PHYSICAL HEALTH: EXERCISE ACTIVITY: HIP ABDUCTION

## 2023-12-21 SDOH — HEALTH STABILITY: PHYSICAL HEALTH: PHYSICAL EXERCISE: SUPINE

## 2023-12-21 SDOH — HEALTH STABILITY: PHYSICAL HEALTH: EXERCISE ACTIVITY: HIP ABDUCTION, BILATERAL

## 2023-12-21 SDOH — HEALTH STABILITY: PHYSICAL HEALTH: EXERCISE ACTIVITY: HEEL SLIDES

## 2023-12-21 SDOH — HEALTH STABILITY: PHYSICAL HEALTH: EXERCISE TYPE: THR EXERCISES

## 2023-12-21 SDOH — HEALTH STABILITY: PHYSICAL HEALTH: EXERCISE ACTIVITY: SHORT ARC QUADS

## 2023-12-21 SDOH — HEALTH STABILITY: PHYSICAL HEALTH: EXERCISE ACTIVITY: ANKLE PUMPS

## 2023-12-21 SDOH — HEALTH STABILITY: PHYSICAL HEALTH: EXERCISE ACTIVITY: PARTIAL SQUATS

## 2023-12-21 SDOH — HEALTH STABILITY: PHYSICAL HEALTH: EXERCISE ACTIVITY: HEEL RAISES

## 2023-12-21 SDOH — HEALTH STABILITY: PHYSICAL HEALTH: EXERCISE ACTIVITY: KNEE FLEXION, BILATERAL

## 2023-12-21 SDOH — HEALTH STABILITY: PHYSICAL HEALTH: EXERCISE ACTIVITY: QUAD AND GLUTE SETS

## 2023-12-21 ASSESSMENT — ENCOUNTER SYMPTOMS
PAIN SEVERITY GOAL: 0/10
SUBJECTIVE PAIN PROGRESSION: GRADUALLY IMPROVING
PAIN LOCATION - EXACERBATING FACTORS: ACTIVITY, TIME OF DAY
PERSON REPORTING PAIN: PATIENT
PAIN: 1
PAIN LOCATION: LEFT HIP
PAIN LOCATION - PAIN QUALITY: ACHING
PAIN LOCATION - PAIN SEVERITY: 1/10
HIGHEST PAIN SEVERITY IN PAST 24 HOURS: 2/10
PAIN LOCATION - PAIN FREQUENCY: INTERMITTENT
LOWEST PAIN SEVERITY IN PAST 24 HOURS: 0/10

## 2023-12-21 ASSESSMENT — ACTIVITIES OF DAILY LIVING (ADL)
AMBULATION ASSISTANCE ON FLAT SURFACES: 1
AMBULATION_DISTANCE/DURATION_TOLERATED: 200

## 2023-12-23 ENCOUNTER — PREP FOR PROCEDURE (OUTPATIENT)
Dept: ORTHOPEDIC SURGERY | Facility: CLINIC | Age: 53
End: 2023-12-23

## 2023-12-23 PROCEDURE — G0180 MD CERTIFICATION HHA PATIENT: HCPCS | Performed by: ORTHOPAEDIC SURGERY

## 2023-12-23 RX ORDER — SODIUM CHLORIDE, SODIUM LACTATE, POTASSIUM CHLORIDE, CALCIUM CHLORIDE 600; 310; 30; 20 MG/100ML; MG/100ML; MG/100ML; MG/100ML
100 INJECTION, SOLUTION INTRAVENOUS CONTINUOUS
Status: CANCELLED | OUTPATIENT
Start: 2023-12-23

## 2023-12-26 ENCOUNTER — HOME CARE VISIT (OUTPATIENT)
Dept: HOME HEALTH SERVICES | Facility: HOME HEALTH | Age: 53
End: 2023-12-26
Payer: COMMERCIAL

## 2023-12-26 VITALS
TEMPERATURE: 97.6 F | SYSTOLIC BLOOD PRESSURE: 122 MMHG | RESPIRATION RATE: 16 BRPM | DIASTOLIC BLOOD PRESSURE: 78 MMHG | HEART RATE: 80 BPM

## 2023-12-26 PROCEDURE — G0157 HHC PT ASSISTANT EA 15: HCPCS

## 2023-12-26 ASSESSMENT — ENCOUNTER SYMPTOMS
PAIN LOCATION - EXACERBATING FACTORS: ACTIVITY
SUBJECTIVE PAIN PROGRESSION: RAPIDLY IMPROVING
PAIN LOCATION: LEFT HIP
PAIN LOCATION - PAIN QUALITY: ACHE, SORE
PAIN: 1
PERSON REPORTING PAIN: PATIENT
PAIN SEVERITY GOAL: 0/10
HIGHEST PAIN SEVERITY IN PAST 24 HOURS: 1/10
LOWEST PAIN SEVERITY IN PAST 24 HOURS: 0/10
PAIN LOCATION - PAIN FREQUENCY: INTERMITTENT
PAIN LOCATION - PAIN SEVERITY: 1/10

## 2023-12-28 ENCOUNTER — HOME CARE VISIT (OUTPATIENT)
Dept: HOME HEALTH SERVICES | Facility: HOME HEALTH | Age: 53
End: 2023-12-28
Payer: COMMERCIAL

## 2023-12-28 VITALS
DIASTOLIC BLOOD PRESSURE: 78 MMHG | SYSTOLIC BLOOD PRESSURE: 126 MMHG | TEMPERATURE: 98 F | RESPIRATION RATE: 16 BRPM | HEART RATE: 70 BPM

## 2023-12-28 PROCEDURE — G0157 HHC PT ASSISTANT EA 15: HCPCS

## 2023-12-28 ASSESSMENT — ENCOUNTER SYMPTOMS
SUBJECTIVE PAIN PROGRESSION: RAPIDLY IMPROVING
PAIN LOCATION - EXACERBATING FACTORS: TIME OF DAY, ACTIVITY
HIGHEST PAIN SEVERITY IN PAST 24 HOURS: 1/10
PAIN LOCATION - PAIN SEVERITY: 1/10
PAIN LOCATION - PAIN FREQUENCY: INFREQUENT
PAIN SEVERITY GOAL: 0/10
PAIN LOCATION: LEFT HIP
PERSON REPORTING PAIN: PATIENT
PAIN: 1
PAIN LOCATION - RELIEVING FACTORS: REST
PAIN LOCATION - PAIN QUALITY: ACHING

## 2023-12-28 ASSESSMENT — ACTIVITIES OF DAILY LIVING (ADL)
AMBULATION_DISTANCE/DURATION_TOLERATED: 200
AMBULATION ASSISTANCE ON FLAT SURFACES: 1

## 2023-12-29 ENCOUNTER — ANCILLARY PROCEDURE (OUTPATIENT)
Dept: RADIOLOGY | Facility: CLINIC | Age: 53
End: 2023-12-29
Payer: COMMERCIAL

## 2023-12-29 ENCOUNTER — OFFICE VISIT (OUTPATIENT)
Dept: ORTHOPEDIC SURGERY | Facility: CLINIC | Age: 53
End: 2023-12-29
Payer: COMMERCIAL

## 2023-12-29 VITALS — HEIGHT: 64 IN | BODY MASS INDEX: 37.56 KG/M2 | WEIGHT: 220 LBS

## 2023-12-29 DIAGNOSIS — Z96.642 STATUS POST LEFT HIP REPLACEMENT: ICD-10-CM

## 2023-12-29 DIAGNOSIS — M16.12 PRIMARY OSTEOARTHRITIS OF LEFT HIP: ICD-10-CM

## 2023-12-29 PROCEDURE — 99024 POSTOP FOLLOW-UP VISIT: CPT | Performed by: ORTHOPAEDIC SURGERY

## 2023-12-29 PROCEDURE — 1036F TOBACCO NON-USER: CPT | Performed by: ORTHOPAEDIC SURGERY

## 2023-12-29 PROCEDURE — 73502 X-RAY EXAM HIP UNI 2-3 VIEWS: CPT | Mod: LT

## 2023-12-29 PROCEDURE — 73502 X-RAY EXAM HIP UNI 2-3 VIEWS: CPT | Mod: LEFT SIDE | Performed by: RADIOLOGY

## 2023-12-29 ASSESSMENT — PAIN - FUNCTIONAL ASSESSMENT: PAIN_FUNCTIONAL_ASSESSMENT: NO/DENIES PAIN

## 2023-12-29 NOTE — PROGRESS NOTES
S: Pain well controlled.  Finishing up home therapy.  Returning to work and February.    O: Incisions healing nicely.  No pain with hip rotation.  Good hip strength.    XR: I personally reviewed the following radiographic exams: AP pelvis left hip shows well-fixed well aligned total hip.    A: S/P left total hip replacement.    P: Discussed to precautions.  Discussed antibiotic prophylaxis.  Work on strength and endurance.  No restrictions.  Follow-up x-ray left hip 1 year postop.

## 2024-01-02 ENCOUNTER — HOME CARE VISIT (OUTPATIENT)
Dept: HOME HEALTH SERVICES | Facility: HOME HEALTH | Age: 54
End: 2024-01-02
Payer: COMMERCIAL

## 2024-01-04 ENCOUNTER — HOME CARE VISIT (OUTPATIENT)
Dept: HOME HEALTH SERVICES | Facility: HOME HEALTH | Age: 54
End: 2024-01-04
Payer: COMMERCIAL

## 2024-01-04 VITALS
OXYGEN SATURATION: 98 % | RESPIRATION RATE: 18 BRPM | DIASTOLIC BLOOD PRESSURE: 70 MMHG | HEART RATE: 72 BPM | TEMPERATURE: 98 F | SYSTOLIC BLOOD PRESSURE: 130 MMHG

## 2024-01-04 PROCEDURE — G0151 HHCP-SERV OF PT,EA 15 MIN: HCPCS

## 2024-01-04 SDOH — HEALTH STABILITY: PHYSICAL HEALTH: EXERCISE TYPE: HEP

## 2024-01-04 ASSESSMENT — ENCOUNTER SYMPTOMS
PERSON REPORTING PAIN: PATIENT
DENIES PAIN: 1
HIGHEST PAIN SEVERITY IN PAST 24 HOURS: 0/10
LOWEST PAIN SEVERITY IN PAST 24 HOURS: 0/10
SUBJECTIVE PAIN PROGRESSION: UNCHANGED
PAIN SEVERITY GOAL: 0/10

## 2024-01-04 ASSESSMENT — ACTIVITIES OF DAILY LIVING (ADL)
HOME_HEALTH_OASIS: 00
OASIS_M1830: 00
AMBULATION ASSISTANCE ON FLAT SURFACES: 1

## 2024-02-06 ENCOUNTER — OFFICE VISIT (OUTPATIENT)
Dept: HEMATOLOGY/ONCOLOGY | Facility: CLINIC | Age: 54
End: 2024-02-06
Payer: COMMERCIAL

## 2024-02-06 VITALS
RESPIRATION RATE: 18 BRPM | DIASTOLIC BLOOD PRESSURE: 77 MMHG | TEMPERATURE: 95.4 F | WEIGHT: 225.2 LBS | OXYGEN SATURATION: 97 % | BODY MASS INDEX: 39.9 KG/M2 | SYSTOLIC BLOOD PRESSURE: 125 MMHG | HEIGHT: 63 IN | HEART RATE: 83 BPM

## 2024-02-06 DIAGNOSIS — D69.3 IDIOPATHIC THROMBOCYTOPENIC PURPURA (MULTI): Primary | ICD-10-CM

## 2024-02-06 LAB
ALBUMIN SERPL BCP-MCNC: 4.7 G/DL (ref 3.4–5)
ALP SERPL-CCNC: 84 U/L (ref 33–110)
ALT SERPL W P-5'-P-CCNC: 14 U/L (ref 7–45)
ANION GAP SERPL CALC-SCNC: 13 MMOL/L (ref 10–20)
AST SERPL W P-5'-P-CCNC: 16 U/L (ref 9–39)
BASOPHILS # BLD AUTO: 0.02 X10*3/UL (ref 0–0.1)
BASOPHILS NFR BLD AUTO: 0.3 %
BILIRUB SERPL-MCNC: 0.3 MG/DL (ref 0–1.2)
BUN SERPL-MCNC: 14 MG/DL (ref 6–23)
CALCIUM SERPL-MCNC: 9.8 MG/DL (ref 8.6–10.3)
CHLORIDE SERPL-SCNC: 103 MMOL/L (ref 98–107)
CO2 SERPL-SCNC: 26 MMOL/L (ref 21–32)
CREAT SERPL-MCNC: 0.53 MG/DL (ref 0.5–1.05)
EGFRCR SERPLBLD CKD-EPI 2021: >90 ML/MIN/1.73M*2
EOSINOPHIL # BLD AUTO: 0.13 X10*3/UL (ref 0–0.7)
EOSINOPHIL NFR BLD AUTO: 2.2 %
ERYTHROCYTE [DISTWIDTH] IN BLOOD BY AUTOMATED COUNT: 14.2 % (ref 11.5–14.5)
GLUCOSE SERPL-MCNC: 106 MG/DL (ref 74–99)
HCT VFR BLD AUTO: 36.5 % (ref 36–46)
HGB BLD-MCNC: 12.1 G/DL (ref 12–16)
IMM GRANULOCYTES # BLD AUTO: 0.01 X10*3/UL (ref 0–0.7)
IMM GRANULOCYTES NFR BLD AUTO: 0.2 % (ref 0–0.9)
LYMPHOCYTES # BLD AUTO: 2.47 X10*3/UL (ref 1.2–4.8)
LYMPHOCYTES NFR BLD AUTO: 42.5 %
MCH RBC QN AUTO: 28.3 PG (ref 26–34)
MCHC RBC AUTO-ENTMCNC: 33.2 G/DL (ref 32–36)
MCV RBC AUTO: 85 FL (ref 80–100)
MONOCYTES # BLD AUTO: 0.37 X10*3/UL (ref 0.1–1)
MONOCYTES NFR BLD AUTO: 6.4 %
NEUTROPHILS # BLD AUTO: 2.81 X10*3/UL (ref 1.2–7.7)
NEUTROPHILS NFR BLD AUTO: 48.4 %
NRBC BLD-RTO: 0 /100 WBCS (ref 0–0)
PLATELET # BLD AUTO: 126 X10*3/UL (ref 150–450)
POTASSIUM SERPL-SCNC: 3.6 MMOL/L (ref 3.5–5.3)
PROT SERPL-MCNC: 7.3 G/DL (ref 6.4–8.2)
RBC # BLD AUTO: 4.28 X10*6/UL (ref 4–5.2)
SODIUM SERPL-SCNC: 138 MMOL/L (ref 136–145)
WBC # BLD AUTO: 5.8 X10*3/UL (ref 4.4–11.3)

## 2024-02-06 PROCEDURE — 83540 ASSAY OF IRON: CPT | Performed by: INTERNAL MEDICINE

## 2024-02-06 PROCEDURE — 1036F TOBACCO NON-USER: CPT | Performed by: INTERNAL MEDICINE

## 2024-02-06 PROCEDURE — 99214 OFFICE O/P EST MOD 30 MIN: CPT | Performed by: INTERNAL MEDICINE

## 2024-02-06 PROCEDURE — 87389 HIV-1 AG W/HIV-1&-2 AB AG IA: CPT | Performed by: INTERNAL MEDICINE

## 2024-02-06 PROCEDURE — 82525 ASSAY OF COPPER: CPT | Performed by: INTERNAL MEDICINE

## 2024-02-06 PROCEDURE — 99204 OFFICE O/P NEW MOD 45 MIN: CPT | Performed by: INTERNAL MEDICINE

## 2024-02-06 PROCEDURE — 84075 ASSAY ALKALINE PHOSPHATASE: CPT | Performed by: INTERNAL MEDICINE

## 2024-02-06 PROCEDURE — 86803 HEPATITIS C AB TEST: CPT | Performed by: INTERNAL MEDICINE

## 2024-02-06 PROCEDURE — 3074F SYST BP LT 130 MM HG: CPT | Performed by: INTERNAL MEDICINE

## 2024-02-06 PROCEDURE — 83615 LACTATE (LD) (LDH) ENZYME: CPT | Performed by: INTERNAL MEDICINE

## 2024-02-06 PROCEDURE — 82977 ASSAY OF GGT: CPT | Performed by: INTERNAL MEDICINE

## 2024-02-06 PROCEDURE — 85025 COMPLETE CBC W/AUTO DIFF WBC: CPT | Performed by: INTERNAL MEDICINE

## 2024-02-06 PROCEDURE — 82728 ASSAY OF FERRITIN: CPT | Performed by: INTERNAL MEDICINE

## 2024-02-06 PROCEDURE — 3078F DIAST BP <80 MM HG: CPT | Performed by: INTERNAL MEDICINE

## 2024-02-06 RX ORDER — MULTIVITAMIN/IRON/FOLIC ACID 18MG-0.4MG
1 TABLET ORAL DAILY
COMMUNITY

## 2024-02-06 ASSESSMENT — ENCOUNTER SYMPTOMS
SLEEP DISTURBANCE: 0
DEPRESSION: 0
BACK PAIN: 0
FEVER: 0
BLOOD IN STOOL: 0
DYSURIA: 0
EYE PROBLEMS: 0
OCCASIONAL FEELINGS OF UNSTEADINESS: 0
TROUBLE SWALLOWING: 0
NAUSEA: 1
LOSS OF SENSATION IN FEET: 0
FATIGUE: 0
HOT FLASHES: 0
HEADACHES: 0
SHORTNESS OF BREATH: 0
ABDOMINAL DISTENTION: 1
BRUISES/BLEEDS EASILY: 0

## 2024-02-06 ASSESSMENT — COLUMBIA-SUICIDE SEVERITY RATING SCALE - C-SSRS
6. HAVE YOU EVER DONE ANYTHING, STARTED TO DO ANYTHING, OR PREPARED TO DO ANYTHING TO END YOUR LIFE?: NO
1. IN THE PAST MONTH, HAVE YOU WISHED YOU WERE DEAD OR WISHED YOU COULD GO TO SLEEP AND NOT WAKE UP?: NO
2. HAVE YOU ACTUALLY HAD ANY THOUGHTS OF KILLING YOURSELF?: NO

## 2024-02-06 ASSESSMENT — PAIN SCALES - GENERAL: PAINLEVEL: 0-NO PAIN

## 2024-02-06 ASSESSMENT — PATIENT HEALTH QUESTIONNAIRE - PHQ9
SUM OF ALL RESPONSES TO PHQ9 QUESTIONS 1 AND 2: 0
1. LITTLE INTEREST OR PLEASURE IN DOING THINGS: NOT AT ALL
2. FEELING DOWN, DEPRESSED OR HOPELESS: NOT AT ALL

## 2024-02-06 NOTE — PROGRESS NOTES
"Patient ID: Maryjane Wallis is a 53 y.o. female.  Referring Physician: No referring provider defined for this encounter.  Primary Care Provider: Jean Paul Jeffers MD      Subjective    HPI  53-year-old woman with a long history of left hip osteoarthritis and replacement, ITP, carpal tunnel of left wrist, hyperlipidemia, hypertension, obesity, prediabetes.  15 years ago with oral bleeding, hematuria, and was diagnosed with ITP, hospitalized, transfused.    Evaluated by hematologist, given steroids.  When she went for left hip replacement, preooperative CBC revealed thrombocytopenia    No nose bleeding  Hematuria  No bruises  No melena  No hemoptysis  No prolonged bleeding with cuts    FH: no blood disorder    Review of Systems   Constitutional:  Negative for fatigue and fever.   HENT:   Negative for trouble swallowing.    Eyes:  Negative for eye problems.   Respiratory:  Negative for shortness of breath.    Cardiovascular:  Negative for chest pain.   Gastrointestinal:  Positive for abdominal distention and nausea. Negative for blood in stool.   Endocrine: Negative for hot flashes.   Genitourinary:  Negative for dysuria.    Musculoskeletal:  Negative for back pain.   Skin:  Negative for itching.   Neurological:  Negative for headaches.   Hematological:  Does not bruise/bleed easily.   Psychiatric/Behavioral:  Negative for sleep disturbance.         Objective   BSA: 2.13 meters squared  /77 (BP Location: Left arm, Patient Position: Sitting, BP Cuff Size: Large adult long)   Pulse 83   Temp 35.2 °C (95.4 °F) (Temporal)   Resp 18   Ht 1.595 m (5' 2.8\")   Wt 102 kg (225 lb 3.2 oz)   SpO2 97%   BMI 40.15 kg/m²     Family History   Problem Relation Name Age of Onset    Coronary artery disease Mother      Stroke Mother      Heart attack Mother      Hypertension Father      Thyroid disease Sister       Oncology History    No history exists.       Maryjane Wallis  reports that she has never smoked. She has never used " smokeless tobacco.  She  reports current alcohol use of about 1.0 standard drink of alcohol per week.  She  reports no history of drug use.    Physical Exam  Constitutional:       Appearance: She is obese.   HENT:      Head: Normocephalic.      Right Ear: External ear normal.      Left Ear: External ear normal.      Nose: Nose normal.      Mouth/Throat:      Mouth: Mucous membranes are moist.      Pharynx: Oropharynx is clear.   Eyes:      Extraocular Movements: Extraocular movements intact.      Pupils: Pupils are equal, round, and reactive to light.   Cardiovascular:      Rate and Rhythm: Normal rate and regular rhythm.   Pulmonary:      Effort: Pulmonary effort is normal.      Breath sounds: Normal breath sounds.   Abdominal:      General: Abdomen is flat. Bowel sounds are normal.   Musculoskeletal:         General: Normal range of motion.      Cervical back: Normal range of motion.   Skin:     General: Skin is warm and dry.   Neurological:      General: No focal deficit present.      Mental Status: She is alert and oriented to person, place, and time.   Psychiatric:         Mood and Affect: Mood normal.         Behavior: Behavior normal.           Assessment/Plan    Summary: 53-year-old woman with a long history of left hip osteoarthritis and replacement, ITP, carpal tunnel of left wrist, hyperlipidemia, hypertension, obesity, prediabetes.    Intermittent acute thrombocytopenia  Onset:  12/8/2023, 118.  Repeat on 12-12-23, 113.    Clumping?: .  No  Associated CBC changes: None.  Bleeding?:  No     Iron deficiency?: no.  LDH .  Schistocytes?: .  B12: 761.  HCV: .  HIV:     [Sister with B12 deficiency]  Liver history: no.  Spleen imaging: no abdominal imaging.  DIC?: .    Covid?:  never had  Possible drug therapy related?:no new medication .    Prior blood transfusion?: yes.  sarcoid?:   no  Lupus anticoagulant?: no history of blood clot.  HIT?     Causes of low platelets:  1) Inherited conditions: Wiskott-Minnie  syndrome, Thrombocytopenia with absent radii, fanconi anemia, May-Hegglin anomaly, Abdias-Soulier syndrome [this is ruled out because she has had normal platelet counts]  2) Nutrition deficiency: B12, folate [history of B12 761]  3) Infection: HIV, B19, HCV, Mono, Rubella, Mumps, chicken pox, Tuberculosis, Malaria, Leishmaniasis  4) alcohol: never heavy  5) radiation: no   6) a large spleen: not known, will assess  7) certain cancers:CLL, CML, Hodgkin lymphoma, NHL, Hairy cell leukemia, myelofibrosis: no history   8) sarcoidosis: no history of  9) pregnancy:  not pregnant  10) particular other hematology conditions: Thalassemia, Hemoglobin SC, Hereditary spherocytosis, Antiphospholipid syndrome, DIC, TTP/HUS, HELLP syndrome, aplastic anemia, amegakaryocytic thrombocytopenia  11) a prior blood transfusion: yes  12) a mechanical heart valve, no  hemangioma (detected by urine hemosiderin)  13) a drug or herbal therapy: no  14) ITP  Plan:   Us LIVER/SPLEEN, GGT, HCV, HIV, flow cytometry, SPEP, LDH, copper  Return to clinic in 1 month         Neo Fitzgerald MD

## 2024-02-06 NOTE — PROGRESS NOTES
Patient here alone for NPV with Dr. Fitzgerald.  Pt. Was self referral for possible ITP.     Reconciled and reviewed medication and allergy list with patient.     Patient Guide reviewed with patient.    Per MD, patient to have labs today, have an US completed and return in 1 month in follow up to review results.    Reviewed AVS with patient.      No barriers to education noted, pt. Agreed to plan and verbalized understanding using teach back method

## 2024-02-07 LAB
FERRITIN SERPL-MCNC: 82 NG/ML (ref 8–150)
GGT SERPL-CCNC: 26 U/L (ref 5–55)
HCV AB SER QL: NONREACTIVE
HIV 1+2 AB+HIV1 P24 AG SERPL QL IA: NONREACTIVE
IRON SATN MFR SERPL: 13 % (ref 25–45)
IRON SERPL-MCNC: 43 UG/DL (ref 35–150)
LDH SERPL L TO P-CCNC: 168 U/L (ref 84–246)
TIBC SERPL-MCNC: 328 UG/DL (ref 240–445)
UIBC SERPL-MCNC: 285 UG/DL (ref 110–370)

## 2024-02-08 LAB — COPPER SERPL-MCNC: 142.6 UG/DL (ref 80–155)

## 2024-02-16 ENCOUNTER — HOSPITAL ENCOUNTER (OUTPATIENT)
Dept: RADIOLOGY | Facility: HOSPITAL | Age: 54
Discharge: HOME | End: 2024-02-16
Payer: COMMERCIAL

## 2024-02-16 DIAGNOSIS — D69.3 IDIOPATHIC THROMBOCYTOPENIC PURPURA (MULTI): ICD-10-CM

## 2024-02-16 PROCEDURE — 76700 US EXAM ABDOM COMPLETE: CPT | Performed by: RADIOLOGY

## 2024-02-16 PROCEDURE — 76700 US EXAM ABDOM COMPLETE: CPT

## 2024-03-04 ENCOUNTER — OFFICE VISIT (OUTPATIENT)
Dept: PRIMARY CARE | Facility: CLINIC | Age: 54
End: 2024-03-04
Payer: COMMERCIAL

## 2024-03-04 VITALS
HEART RATE: 60 BPM | BODY MASS INDEX: 37.56 KG/M2 | SYSTOLIC BLOOD PRESSURE: 126 MMHG | OXYGEN SATURATION: 98 % | RESPIRATION RATE: 16 BRPM | HEIGHT: 64 IN | WEIGHT: 220 LBS | DIASTOLIC BLOOD PRESSURE: 76 MMHG

## 2024-03-04 DIAGNOSIS — Z12.31 ENCOUNTER FOR SCREENING MAMMOGRAM FOR MALIGNANT NEOPLASM OF BREAST: ICD-10-CM

## 2024-03-04 DIAGNOSIS — R73.03 PREDIABETES: ICD-10-CM

## 2024-03-04 DIAGNOSIS — E78.5 HYPERLIPIDEMIA LDL GOAL <130: ICD-10-CM

## 2024-03-04 DIAGNOSIS — E78.5 HYPERLIPIDEMIA, UNSPECIFIED HYPERLIPIDEMIA TYPE: ICD-10-CM

## 2024-03-04 DIAGNOSIS — I10 ESSENTIAL HYPERTENSION: Primary | ICD-10-CM

## 2024-03-04 DIAGNOSIS — D69.3 IMMUNE THROMBOCYTOPENIA (MULTI): ICD-10-CM

## 2024-03-04 PROBLEM — R03.0 PREHYPERTENSION: Status: ACTIVE | Noted: 2024-03-04

## 2024-03-04 PROBLEM — Z90.710 HISTORY OF HYSTERECTOMY: Status: ACTIVE | Noted: 2024-03-04

## 2024-03-04 PROCEDURE — 3074F SYST BP LT 130 MM HG: CPT | Performed by: INTERNAL MEDICINE

## 2024-03-04 PROCEDURE — 99396 PREV VISIT EST AGE 40-64: CPT | Performed by: INTERNAL MEDICINE

## 2024-03-04 PROCEDURE — 1036F TOBACCO NON-USER: CPT | Performed by: INTERNAL MEDICINE

## 2024-03-04 PROCEDURE — 3078F DIAST BP <80 MM HG: CPT | Performed by: INTERNAL MEDICINE

## 2024-03-04 ASSESSMENT — ENCOUNTER SYMPTOMS
ABDOMINAL DISTENTION: 0
SHORTNESS OF BREATH: 0
NECK STIFFNESS: 0
HEADACHES: 0
NAUSEA: 0
NECK PAIN: 0
COUGH: 0
DIFFICULTY URINATING: 0
FATIGUE: 0
WHEEZING: 0
BLOOD IN STOOL: 0
FREQUENCY: 0
DIAPHORESIS: 0
HEMATURIA: 0
SINUS PRESSURE: 0
PALPITATIONS: 0
APPETITE CHANGE: 0
NUMBNESS: 0
DIZZINESS: 0
MYALGIAS: 0
FEVER: 0
VOMITING: 0
BACK PAIN: 0
CONSTIPATION: 0
ARTHRALGIAS: 0
DYSURIA: 0
RHINORRHEA: 0
LIGHT-HEADEDNESS: 0
SORE THROAT: 0
ABDOMINAL PAIN: 0
WEAKNESS: 0
DIARRHEA: 0
JOINT SWELLING: 0
CHILLS: 0

## 2024-03-04 NOTE — PROGRESS NOTES
"Subjective   Patient ID: Maryjanejeffry Wallis is a 53 y.o. female who presents for Annual Exam (refills).    HPI   She presents for follow-up. She is doing well generally.     Review of Systems   Constitutional:  Negative for appetite change, chills, diaphoresis, fatigue and fever.   HENT:  Negative for congestion, ear discharge, ear pain, hearing loss, postnasal drip, rhinorrhea, sinus pressure, sore throat and tinnitus.    Eyes:  Negative for visual disturbance.   Respiratory:  Negative for cough, shortness of breath and wheezing.    Cardiovascular:  Negative for chest pain, palpitations and leg swelling.   Gastrointestinal:  Negative for abdominal distention, abdominal pain, blood in stool, constipation, diarrhea, nausea and vomiting.   Genitourinary:  Negative for decreased urine volume, difficulty urinating, dysuria, frequency, hematuria and urgency.   Musculoskeletal:  Negative for arthralgias, back pain, gait problem, joint swelling, myalgias, neck pain and neck stiffness.   Skin:  Negative for rash.   Neurological:  Negative for dizziness, weakness, light-headedness, numbness and headaches.         Objective   /76   Pulse 60   Resp 16   Ht 1.626 m (5' 4\")   Wt 99.8 kg (220 lb)   SpO2 98%   BMI 37.76 kg/m²     Physical Exam  Vitals reviewed.   Constitutional:       Appearance: Normal appearance. She is normal weight.   HENT:      Right Ear: Tympanic membrane and external ear normal.      Left Ear: Tympanic membrane and external ear normal.   Eyes:      Extraocular Movements: Extraocular movements intact.      Conjunctiva/sclera: Conjunctivae normal.      Pupils: Pupils are equal, round, and reactive to light.   Cardiovascular:      Rate and Rhythm: Normal rate and regular rhythm.      Pulses: Normal pulses.   Pulmonary:      Effort: Pulmonary effort is normal.      Breath sounds: Normal breath sounds.   Abdominal:      General: Bowel sounds are normal.      Palpations: Abdomen is soft.   Musculoskeletal: "         General: Normal range of motion.      Cervical back: Normal range of motion.   Skin:     General: Skin is warm and dry.   Neurological:      General: No focal deficit present.      Mental Status: She is oriented to person, place, and time.   Psychiatric:         Mood and Affect: Mood normal.         Behavior: Behavior normal.           Assessment/Plan   Problem List Items Addressed This Visit             ICD-10-CM    Essential hypertension - Primary I10     -BP above target goal 130/80  -CMP, TSH ordered  -Continue amlodipine 5 mg daily   -Counselled on weight loss low sodium diet, DASH diet and exercise            Relevant Orders    TSH with reflex to Free T4 if abnormal    Hyperlipidemia LDL goal <130 E78.5     Repeat lipid panel   Continue atorvastatin 40 mg daily          Prediabetes R73.03     Repeat A1c          Relevant Orders    Hemoglobin A1C    Hyperlipidemia E78.5    Relevant Orders    Cholesterol, LDL Direct    Lipid Panel Non-Fasting    Immune thrombocytopenia (CMS/HCC) D69.3     Stable  Continue to monitor.          Encounter for screening mammogram for malignant neoplasm of breast Z12.31    Relevant Orders    BI mammo bilateral screening tomosynthesis   RTC in 6 mo

## 2024-03-04 NOTE — ASSESSMENT & PLAN NOTE
-BP above target goal 130/80  -CMP, TSH ordered  -Continue amlodipine 5 mg daily   -Counselled on weight loss low sodium diet, DASH diet and exercise

## 2024-04-01 ENCOUNTER — APPOINTMENT (OUTPATIENT)
Dept: HEMATOLOGY/ONCOLOGY | Facility: CLINIC | Age: 54
End: 2024-04-01
Payer: COMMERCIAL

## 2024-04-22 DIAGNOSIS — I10 ESSENTIAL (PRIMARY) HYPERTENSION: ICD-10-CM

## 2024-04-23 RX ORDER — AMLODIPINE BESYLATE 5 MG/1
TABLET ORAL
Qty: 90 TABLET | Refills: 0 | Status: SHIPPED | OUTPATIENT
Start: 2024-04-23

## 2024-05-15 DIAGNOSIS — Z96.642 STATUS POST LEFT HIP REPLACEMENT: ICD-10-CM

## 2024-05-15 DIAGNOSIS — Z96.651 STATUS POST RIGHT KNEE REPLACEMENT: ICD-10-CM

## 2024-05-16 RX ORDER — AMOXICILLIN 500 MG/1
2000 CAPSULE ORAL ONCE
Qty: 4 CAPSULE | Refills: 0 | Status: SHIPPED | OUTPATIENT
Start: 2024-05-16 | End: 2024-05-16

## 2024-05-31 ENCOUNTER — LAB (OUTPATIENT)
Dept: LAB | Facility: LAB | Age: 54
End: 2024-05-31
Payer: COMMERCIAL

## 2024-05-31 DIAGNOSIS — R73.03 PREDIABETES: ICD-10-CM

## 2024-05-31 DIAGNOSIS — I10 ESSENTIAL HYPERTENSION: ICD-10-CM

## 2024-05-31 DIAGNOSIS — E78.5 HYPERLIPIDEMIA, UNSPECIFIED HYPERLIPIDEMIA TYPE: ICD-10-CM

## 2024-05-31 DIAGNOSIS — D69.3 IDIOPATHIC THROMBOCYTOPENIC PURPURA (MULTI): ICD-10-CM

## 2024-05-31 LAB
CHOLEST SERPL-MCNC: 123 MG/DL (ref 0–199)
CHOLESTEROL/HDL RATIO: 3.8
EST. AVERAGE GLUCOSE BLD GHB EST-MCNC: 117 MG/DL
HBA1C MFR BLD: 5.7 %
HDLC SERPL-MCNC: 32.1 MG/DL
LDLC SERPL DIRECT ASSAY-MCNC: 81 MG/DL (ref 0–129)
NON-HDL CHOLESTEROL: 91 MG/DL (ref 0–149)
TSH SERPL-ACNC: 0.95 MIU/L (ref 0.44–3.98)

## 2024-05-31 PROCEDURE — 36415 COLL VENOUS BLD VENIPUNCTURE: CPT

## 2024-05-31 PROCEDURE — 83718 ASSAY OF LIPOPROTEIN: CPT

## 2024-05-31 PROCEDURE — 88184 FLOWCYTOMETRY/ TC 1 MARKER: CPT

## 2024-05-31 PROCEDURE — 83036 HEMOGLOBIN GLYCOSYLATED A1C: CPT

## 2024-05-31 PROCEDURE — 83721 ASSAY OF BLOOD LIPOPROTEIN: CPT

## 2024-05-31 PROCEDURE — 88185 FLOWCYTOMETRY/TC ADD-ON: CPT

## 2024-05-31 PROCEDURE — 84443 ASSAY THYROID STIM HORMONE: CPT

## 2024-05-31 PROCEDURE — 82465 ASSAY BLD/SERUM CHOLESTEROL: CPT

## 2024-06-03 LAB
CELL COUNT (BLOOD): 4.47 X10*3/UL
CELL POPULATIONS: NORMAL
DIAGNOSIS: NORMAL
FLOW DIFFERENTIAL: NORMAL
FLOW TEST ORDERED: NORMAL
LAB TEST METHOD: NORMAL
NUMBER OF CELLS COLLECTED: NORMAL PER TUBE
PATH REPORT.TOTAL CANCER: NORMAL
SIGNATURE COMMENT: NORMAL
SPECIMEN VIABILITY: NORMAL

## 2024-06-20 DIAGNOSIS — E78.5 HYPERLIPIDEMIA, UNSPECIFIED: ICD-10-CM

## 2024-06-20 RX ORDER — ATORVASTATIN CALCIUM 40 MG/1
40 TABLET, FILM COATED ORAL DAILY
Qty: 90 TABLET | Refills: 0 | Status: SHIPPED | OUTPATIENT
Start: 2024-06-20

## 2024-07-16 ENCOUNTER — HOSPITAL ENCOUNTER (OUTPATIENT)
Dept: RADIOLOGY | Facility: CLINIC | Age: 54
Discharge: HOME | End: 2024-07-16
Payer: COMMERCIAL

## 2024-07-16 VITALS — WEIGHT: 225 LBS | HEIGHT: 63 IN | BODY MASS INDEX: 39.87 KG/M2

## 2024-07-16 DIAGNOSIS — Z12.31 ENCOUNTER FOR SCREENING MAMMOGRAM FOR MALIGNANT NEOPLASM OF BREAST: ICD-10-CM

## 2024-07-16 PROCEDURE — 77063 BREAST TOMOSYNTHESIS BI: CPT | Performed by: RADIOLOGY

## 2024-07-16 PROCEDURE — 77067 SCR MAMMO BI INCL CAD: CPT | Performed by: RADIOLOGY

## 2024-07-16 PROCEDURE — 77063 BREAST TOMOSYNTHESIS BI: CPT

## 2024-07-18 ENCOUNTER — OFFICE VISIT (OUTPATIENT)
Dept: ORTHOPEDIC SURGERY | Facility: CLINIC | Age: 54
End: 2024-07-18
Payer: COMMERCIAL

## 2024-07-18 ENCOUNTER — HOSPITAL ENCOUNTER (OUTPATIENT)
Dept: RADIOLOGY | Facility: CLINIC | Age: 54
Discharge: HOME | End: 2024-07-18
Payer: COMMERCIAL

## 2024-07-18 VITALS — WEIGHT: 225 LBS | BODY MASS INDEX: 39.87 KG/M2 | HEIGHT: 63 IN

## 2024-07-18 DIAGNOSIS — Z96.651 STATUS POST RIGHT KNEE REPLACEMENT: ICD-10-CM

## 2024-07-18 DIAGNOSIS — S83.90XS SPRAIN OF KNEE, SEQUELA: Primary | ICD-10-CM

## 2024-07-18 PROCEDURE — 1036F TOBACCO NON-USER: CPT | Performed by: ORTHOPAEDIC SURGERY

## 2024-07-18 PROCEDURE — 3008F BODY MASS INDEX DOCD: CPT | Performed by: ORTHOPAEDIC SURGERY

## 2024-07-18 PROCEDURE — 73562 X-RAY EXAM OF KNEE 3: CPT | Mod: RT

## 2024-07-18 PROCEDURE — 99214 OFFICE O/P EST MOD 30 MIN: CPT | Performed by: ORTHOPAEDIC SURGERY

## 2024-07-18 PROCEDURE — 73562 X-RAY EXAM OF KNEE 3: CPT | Mod: RIGHT SIDE | Performed by: RADIOLOGY

## 2024-07-18 ASSESSMENT — PAIN SCALES - GENERAL: PAINLEVEL_OUTOF10: 8

## 2024-07-18 ASSESSMENT — PAIN DESCRIPTION - DESCRIPTORS: DESCRIPTORS: SORE

## 2024-07-18 ASSESSMENT — PAIN - FUNCTIONAL ASSESSMENT: PAIN_FUNCTIONAL_ASSESSMENT: 0-10

## 2024-07-18 NOTE — PROGRESS NOTES
Returns for right knee.  Slipped getting out of the shower about 6 weeks ago.  Initially felt okay.  Having some pain and tightness in the back of the knee over the last few weeks.  Wanted to make sure nothing was out of place.  Occasionally gets some spasms.    WD/WN overweight female  A+O X3  No lymphedema  Inspection of both knees shows symmetric alignment.   No pain with hip logroll.  No flexion contracture.   No crepitus through range of motion.   5/5 strength in quads and hamstrings.   Stable varus/valgus stress.   (-) Lachman.   (-) Toby.   Sensation intact to LT.   Good pulses in both legs.   No apparent effusion.   No erythema.    I personally reviewed the following radiographic exams: X-ray right knee compared to last year's film shows no change in alignment.  No lucency.  No effusion.  Well 6 cemented total knee.    Assessment: Remote total knee with recent sprain.    Plan: Discussed nonoperative and operative options in detail.   Risk and benefits discussed in detail. All questions answered today.  Recovery timeline and expectations discussed in detail.  Reassured patient.  Alternate Tylenol with Motrin.  No therapy necessary.  Should improve with time.

## 2024-09-09 ENCOUNTER — APPOINTMENT (OUTPATIENT)
Dept: PRIMARY CARE | Facility: CLINIC | Age: 54
End: 2024-09-09
Payer: COMMERCIAL

## 2024-09-15 DIAGNOSIS — E78.5 HYPERLIPIDEMIA, UNSPECIFIED: ICD-10-CM

## 2024-09-16 RX ORDER — ATORVASTATIN CALCIUM 40 MG/1
40 TABLET, FILM COATED ORAL DAILY
Qty: 90 TABLET | Refills: 0 | Status: SHIPPED | OUTPATIENT
Start: 2024-09-16

## 2024-10-11 DIAGNOSIS — Z96.642 STATUS POST LEFT HIP REPLACEMENT: ICD-10-CM

## 2024-10-11 DIAGNOSIS — Z96.651 STATUS POST RIGHT KNEE REPLACEMENT: ICD-10-CM

## 2024-10-14 RX ORDER — AMOXICILLIN 500 MG/1
2000 TABLET, FILM COATED ORAL ONCE
Qty: 4 TABLET | Refills: 0 | Status: SHIPPED | OUTPATIENT
Start: 2024-10-14 | End: 2024-10-16

## 2024-10-16 ENCOUNTER — APPOINTMENT (OUTPATIENT)
Dept: PRIMARY CARE | Facility: CLINIC | Age: 54
End: 2024-10-16
Payer: COMMERCIAL

## 2024-10-16 ENCOUNTER — OFFICE VISIT (OUTPATIENT)
Dept: PRIMARY CARE | Facility: CLINIC | Age: 54
End: 2024-10-16
Payer: COMMERCIAL

## 2024-10-16 VITALS
OXYGEN SATURATION: 99 % | HEIGHT: 63 IN | BODY MASS INDEX: 39.86 KG/M2 | HEART RATE: 81 BPM | RESPIRATION RATE: 18 BRPM | DIASTOLIC BLOOD PRESSURE: 84 MMHG | SYSTOLIC BLOOD PRESSURE: 120 MMHG | TEMPERATURE: 96.1 F

## 2024-10-16 DIAGNOSIS — Z12.11 SCREENING FOR COLON CANCER: ICD-10-CM

## 2024-10-16 DIAGNOSIS — Z13.0 SCREENING FOR DEFICIENCY ANEMIA: ICD-10-CM

## 2024-10-16 DIAGNOSIS — I10 ESSENTIAL (PRIMARY) HYPERTENSION: ICD-10-CM

## 2024-10-16 DIAGNOSIS — Z00.00 ANNUAL PHYSICAL EXAM: Primary | ICD-10-CM

## 2024-10-16 DIAGNOSIS — E78.5 HYPERLIPIDEMIA, UNSPECIFIED: ICD-10-CM

## 2024-10-16 DIAGNOSIS — Z23 ENCOUNTER FOR IMMUNIZATION: ICD-10-CM

## 2024-10-16 PROCEDURE — 99396 PREV VISIT EST AGE 40-64: CPT | Performed by: NURSE PRACTITIONER

## 2024-10-16 PROCEDURE — 1036F TOBACCO NON-USER: CPT | Performed by: NURSE PRACTITIONER

## 2024-10-16 PROCEDURE — 3074F SYST BP LT 130 MM HG: CPT | Performed by: NURSE PRACTITIONER

## 2024-10-16 PROCEDURE — 90471 IMMUNIZATION ADMIN: CPT | Performed by: NURSE PRACTITIONER

## 2024-10-16 PROCEDURE — 3079F DIAST BP 80-89 MM HG: CPT | Performed by: NURSE PRACTITIONER

## 2024-10-16 PROCEDURE — 90656 IIV3 VACC NO PRSV 0.5 ML IM: CPT | Performed by: NURSE PRACTITIONER

## 2024-10-16 RX ORDER — ATORVASTATIN CALCIUM 40 MG/1
40 TABLET, FILM COATED ORAL DAILY
Qty: 90 TABLET | Refills: 0 | Status: SHIPPED | OUTPATIENT
Start: 2024-10-16

## 2024-10-16 RX ORDER — AMLODIPINE BESYLATE 5 MG/1
5 TABLET ORAL DAILY
Qty: 90 TABLET | Refills: 0 | Status: SHIPPED | OUTPATIENT
Start: 2024-10-16

## 2024-10-16 ASSESSMENT — ENCOUNTER SYMPTOMS
MUSCULOSKELETAL NEGATIVE: 1
LOSS OF SENSATION IN FEET: 0
ENDOCRINE NEGATIVE: 1
CARDIOVASCULAR NEGATIVE: 1
NEUROLOGICAL NEGATIVE: 1
PSYCHIATRIC NEGATIVE: 1
RESPIRATORY NEGATIVE: 1
GASTROINTESTINAL NEGATIVE: 1
ALLERGIC/IMMUNOLOGIC NEGATIVE: 1
CONSTITUTIONAL NEGATIVE: 1
HEMATOLOGIC/LYMPHATIC NEGATIVE: 1
EYES NEGATIVE: 1
OCCASIONAL FEELINGS OF UNSTEADINESS: 0
DEPRESSION: 0

## 2024-10-16 ASSESSMENT — PAIN SCALES - GENERAL: PAINLEVEL_OUTOF10: 0-NO PAIN

## 2024-10-16 NOTE — PROGRESS NOTES
"Chief Complaint  Maryjane Wallis is a 53 y.o. female presenting for \"Annual Exam (Physical - work form, refills/Mammo in July/Cologuard years ago).\"    HPI     Maryjane Wallis is a 53 y.o. female presenting for physical and labs, new to the office, she had a knee and hip replacement  history.  She has well controlled htn on meds takes chol med, labs in and cologuard is overdue that  is also ordered.         Past Medical History  Patient Active Problem List    Diagnosis Date Noted   • History of hysterectomy 03/04/2024   • Prehypertension 03/04/2024   • Immune thrombocytopenia (Multi) 03/04/2024   • Encounter for screening mammogram for malignant neoplasm of breast 03/04/2024   • Arthritis, hip 12/12/2023   • Hyperlipidemia 10/06/2023   • Primary localized osteoarthritis of right knee 10/06/2023   • Primary osteoarthritis of left hip 10/06/2023   • Morbid obesity with BMI of 40.0-44.9, adult (Multi) 10/06/2023   • Vitamin D deficiency 07/31/2023   • Hot flashes due to menopause 07/27/2023   • Palpitations 07/27/2023   • Prediabetes 07/27/2023   • Abnormal mammogram 07/26/2023   • Abnormal TSH 07/26/2023   • Axillary adenopathy 07/26/2023   • Carpal tunnel syndrome of left wrist 07/26/2023   • Essential hypertension 07/26/2023   • Headache 07/26/2023   • Hyperlipidemia LDL goal <130 07/26/2023   • Intermittent tingling sensation of left hand and foot 07/26/2023   • Knee pain 07/26/2023   • Lesion of left ulnar nerve 07/26/2023   • Obesity 07/26/2023   • Osteoarthritis of knees, bilateral 07/26/2023   • Primary localized osteoarthritis of knee 07/26/2023   • Primary localized osteoarthritis of hip 07/26/2023   • Status post right knee replacement 07/26/2023   • Abdominal pain 07/26/2023   • Long term (current) use of aspirin 10/06/2022   • Presence of right artificial knee joint 09/23/2022        Medications  Current Outpatient Medications   Medication Instructions   • amLODIPine (NORVASC) 5 mg, oral, Daily   • amoxicillin " (AMOXIL) 2,000 mg, oral, Once, Take 4 tablets one hour prior to dental appointment   • atorvastatin (LIPITOR) 40 mg, oral, Daily        Surgical History  She has a past surgical history that includes Carpal tunnel release (Bilateral); Tubal ligation (2004); Hysterectomy (2013); Total knee arthroplasty (Right, 2022); and Breast biopsy (Right).     Social History  She reports that she has never smoked. She has never been exposed to tobacco smoke. She has never used smokeless tobacco. She reports current alcohol use of about 1.0 standard drink of alcohol per week. She reports that she does not use drugs.    Family History  Family History   Problem Relation Name Age of Onset   • Coronary artery disease Mother     • Stroke Mother     • Heart attack Mother     • Hypertension Father     • Thyroid disease Sister          Allergies  Patient has no known allergies.    ROS  Review of Systems   Constitutional: Negative.    HENT: Negative.     Eyes: Negative.    Respiratory: Negative.     Cardiovascular: Negative.    Gastrointestinal: Negative.    Endocrine: Negative.    Genitourinary: Negative.    Musculoskeletal: Negative.    Skin: Negative.    Allergic/Immunologic: Negative.    Neurological: Negative.    Hematological: Negative.    Psychiatric/Behavioral: Negative.          Last Recorded Vitals  /84 (BP Location: Left arm, Patient Position: Sitting, BP Cuff Size: Adult)   Pulse 81   Temp 35.6 °C (96.1 °F) (Tympanic)   Resp 18   SpO2 99%     Physical Exam  Vitals and nursing note reviewed.   Constitutional:       Appearance: Normal appearance.   HENT:      Head: Normocephalic and atraumatic.      Right Ear: Tympanic membrane, ear canal and external ear normal.      Left Ear: Tympanic membrane, ear canal and external ear normal.      Nose: Nose normal.      Mouth/Throat:      Mouth: Mucous membranes are moist.      Pharynx: Oropharynx is clear.   Eyes:      Extraocular Movements: Extraocular movements intact.       Conjunctiva/sclera: Conjunctivae normal.      Pupils: Pupils are equal, round, and reactive to light.   Neck:      Thyroid: No thyromegaly.   Cardiovascular:      Rate and Rhythm: Normal rate and regular rhythm.      Pulses: Normal pulses.      Heart sounds: Normal heart sounds, S1 normal and S2 normal.   Pulmonary:      Effort: Pulmonary effort is normal.      Breath sounds: Normal breath sounds. No wheezing or rhonchi.   Abdominal:      General: Bowel sounds are normal.      Palpations: Abdomen is soft. There is no mass.      Tenderness: There is no abdominal tenderness. There is no guarding.   Genitourinary:     Comments: Not examined  Musculoskeletal:         General: Normal range of motion.      Cervical back: Normal range of motion.      Right lower leg: No edema.      Left lower leg: No edema.   Lymphadenopathy:      Cervical: No cervical adenopathy.   Skin:     General: Skin is warm and dry.      Capillary Refill: Capillary refill takes less than 2 seconds.      Findings: No rash.   Neurological:      General: No focal deficit present.      Mental Status: She is alert and oriented to person, place, and time. Mental status is at baseline.      Cranial Nerves: Cranial nerves 2-12 are intact. No cranial nerve deficit.      Sensory: Sensation is intact.      Motor: Motor function is intact.      Coordination: Coordination is intact.      Gait: Gait is intact.   Psychiatric:         Mood and Affect: Mood normal.         Behavior: Behavior normal.         Thought Content: Thought content normal.         Judgment: Judgment normal.       Relevant Results      Assessment/Plan   Maryjane was seen today for annual exam.  Diagnoses and all orders for this visit:  Annual physical exam (Primary)  -     Comprehensive Metabolic Panel; Future  -     Lipid Panel; Future  Screening for colon cancer  -     Cologuard® colon cancer screening; Future  -     Cologuard® colon cancer screening  Essential (primary) hypertension  -      amLODIPine (Norvasc) 5 mg tablet; Take 1 tablet (5 mg) by mouth once daily.  Hyperlipidemia, unspecified  -     atorvastatin (Lipitor) 40 mg tablet; Take 1 tablet (40 mg) by mouth once daily.  Screening for deficiency anemia  -     Vitamin B12; Future  -     CBC and Auto Differential; Future  Encounter for immunization  -     Flu vaccine, trivalent, preservative free, age 6 months and greater (Fluraix/Fluzone/Flulaval)          COUNSELING      Medication education:              Education:  The patient is counseled regarding potential side-effects of any and all new medications             Understanding:  Patient expressed understanding             Adherence:  No barriers to adherence identified        Faar Cruz, APRN-CNP

## 2024-10-17 ENCOUNTER — LAB (OUTPATIENT)
Dept: LAB | Facility: LAB | Age: 54
End: 2024-10-17
Payer: COMMERCIAL

## 2024-10-17 DIAGNOSIS — Z00.00 ANNUAL PHYSICAL EXAM: ICD-10-CM

## 2024-10-17 DIAGNOSIS — D69.6 THROMBOCYTOPENIA (CMS-HCC): Primary | ICD-10-CM

## 2024-10-17 DIAGNOSIS — Z13.0 SCREENING FOR DEFICIENCY ANEMIA: ICD-10-CM

## 2024-10-17 LAB
ALBUMIN SERPL BCP-MCNC: 4.8 G/DL (ref 3.4–5)
ALP SERPL-CCNC: 96 U/L (ref 33–110)
ALT SERPL W P-5'-P-CCNC: 8 U/L (ref 7–45)
ANION GAP SERPL CALC-SCNC: 15 MMOL/L (ref 10–20)
AST SERPL W P-5'-P-CCNC: 13 U/L (ref 9–39)
BASOPHILS # BLD AUTO: ABNORMAL 10*3/UL
BASOPHILS NFR BLD AUTO: ABNORMAL %
BILIRUB SERPL-MCNC: 0.7 MG/DL (ref 0–1.2)
BUN SERPL-MCNC: 11 MG/DL (ref 6–23)
CALCIUM SERPL-MCNC: 9.9 MG/DL (ref 8.6–10.6)
CHLORIDE SERPL-SCNC: 104 MMOL/L (ref 98–107)
CHOLEST SERPL-MCNC: 120 MG/DL (ref 0–199)
CHOLESTEROL/HDL RATIO: 3.8
CO2 SERPL-SCNC: 26 MMOL/L (ref 21–32)
CREAT SERPL-MCNC: 0.74 MG/DL (ref 0.5–1.05)
EGFRCR SERPLBLD CKD-EPI 2021: >90 ML/MIN/1.73M*2
EOSINOPHIL # BLD AUTO: ABNORMAL 10*3/UL
EOSINOPHIL NFR BLD AUTO: ABNORMAL %
ERYTHROCYTE [DISTWIDTH] IN BLOOD BY AUTOMATED COUNT: 13.2 % (ref 11.5–14.5)
GLUCOSE SERPL-MCNC: 90 MG/DL (ref 74–99)
HCT VFR BLD AUTO: 41.7 % (ref 36–46)
HDLC SERPL-MCNC: 31.5 MG/DL
HGB BLD-MCNC: 13.7 G/DL (ref 12–16)
IMM GRANULOCYTES # BLD AUTO: 0.01 X10*3/UL (ref 0–0.7)
IMM GRANULOCYTES NFR BLD AUTO: 0.3 % (ref 0–0.9)
LDLC SERPL CALC-MCNC: 71 MG/DL
LYMPHOCYTES # BLD AUTO: ABNORMAL 10*3/UL
LYMPHOCYTES NFR BLD AUTO: ABNORMAL %
MCH RBC QN AUTO: 28.7 PG (ref 26–34)
MCHC RBC AUTO-ENTMCNC: 32.9 G/DL (ref 32–36)
MCV RBC AUTO: 87 FL (ref 80–100)
MONOCYTES # BLD AUTO: ABNORMAL 10*3/UL
MONOCYTES NFR BLD AUTO: ABNORMAL %
NEUTROPHILS # BLD AUTO: ABNORMAL 10*3/UL
NEUTROPHILS NFR BLD AUTO: ABNORMAL %
NON HDL CHOLESTEROL: 89 MG/DL (ref 0–149)
NRBC BLD-RTO: 0 /100 WBCS (ref 0–0)
PLATELET # BLD AUTO: 44 X10*3/UL (ref 150–450)
POTASSIUM SERPL-SCNC: 3.7 MMOL/L (ref 3.5–5.3)
PROT SERPL-MCNC: 7.6 G/DL (ref 6.4–8.2)
RBC # BLD AUTO: 4.78 X10*6/UL (ref 4–5.2)
SODIUM SERPL-SCNC: 141 MMOL/L (ref 136–145)
TRIGL SERPL-MCNC: 87 MG/DL (ref 0–149)
VIT B12 SERPL-MCNC: 741 PG/ML (ref 211–911)
VLDL: 17 MG/DL (ref 0–40)
WBC # BLD AUTO: 2.9 X10*3/UL (ref 4.4–11.3)

## 2024-10-17 PROCEDURE — 80053 COMPREHEN METABOLIC PANEL: CPT

## 2024-10-17 PROCEDURE — 82607 VITAMIN B-12: CPT

## 2024-10-17 PROCEDURE — 85060 BLOOD SMEAR INTERPRETATION: CPT | Performed by: NURSE PRACTITIONER

## 2024-10-17 PROCEDURE — 36415 COLL VENOUS BLD VENIPUNCTURE: CPT

## 2024-10-17 PROCEDURE — 80061 LIPID PANEL: CPT

## 2024-10-18 LAB — PATH REVIEW-CBC DIFFERENTIAL: NORMAL

## 2024-10-18 NOTE — RESULT ENCOUNTER NOTE
Patient's white blood cell count and platelets are extremely low, need ov with hematology as soon as possible, she needs to be very careful to not cut or bump herself to prevent uncontrolled bleeding.  I have put in a referral, see if we can get her scheduled.

## 2024-10-22 ENCOUNTER — OFFICE VISIT (OUTPATIENT)
Dept: HEMATOLOGY/ONCOLOGY | Facility: HOSPITAL | Age: 54
End: 2024-10-22
Payer: COMMERCIAL

## 2024-10-22 ENCOUNTER — LAB (OUTPATIENT)
Dept: LAB | Facility: LAB | Age: 54
End: 2024-10-22
Payer: COMMERCIAL

## 2024-10-22 VITALS
RESPIRATION RATE: 18 BRPM | BODY MASS INDEX: 36.74 KG/M2 | DIASTOLIC BLOOD PRESSURE: 97 MMHG | SYSTOLIC BLOOD PRESSURE: 141 MMHG | WEIGHT: 207.4 LBS | TEMPERATURE: 96.9 F | HEART RATE: 74 BPM | OXYGEN SATURATION: 97 %

## 2024-10-22 DIAGNOSIS — D69.6 THROMBOCYTOPENIA (CMS-HCC): ICD-10-CM

## 2024-10-22 DIAGNOSIS — D69.6 THROMBOCYTOPENIA (CMS-HCC): Primary | ICD-10-CM

## 2024-10-22 LAB
ALBUMIN SERPL BCP-MCNC: 4.9 G/DL (ref 3.4–5)
ALP SERPL-CCNC: 84 U/L (ref 33–110)
ALT SERPL W P-5'-P-CCNC: 8 U/L (ref 7–45)
ANION GAP SERPL CALC-SCNC: 12 MMOL/L (ref 10–20)
APTT PPP: 30 SECONDS (ref 27–38)
AST SERPL W P-5'-P-CCNC: 14 U/L (ref 9–39)
BASOPHILS # BLD AUTO: 0.03 X10*3/UL (ref 0–0.1)
BASOPHILS NFR BLD AUTO: 0.8 %
BILIRUB SERPL-MCNC: 0.5 MG/DL (ref 0–1.2)
BUN SERPL-MCNC: 11 MG/DL (ref 6–23)
CALCIUM SERPL-MCNC: 10.1 MG/DL (ref 8.6–10.3)
CHLORIDE SERPL-SCNC: 100 MMOL/L (ref 98–107)
CO2 SERPL-SCNC: 30 MMOL/L (ref 21–32)
CREAT SERPL-MCNC: 0.67 MG/DL (ref 0.5–1.05)
D DIMER PPP FEU-MCNC: 449 NG/ML FEU
EGFRCR SERPLBLD CKD-EPI 2021: >90 ML/MIN/1.73M*2
EOSINOPHIL # BLD AUTO: 0.12 X10*3/UL (ref 0–0.7)
EOSINOPHIL NFR BLD AUTO: 3.3 %
ERYTHROCYTE [DISTWIDTH] IN BLOOD BY AUTOMATED COUNT: 13.2 % (ref 11.5–14.5)
ERYTHROCYTE [SEDIMENTATION RATE] IN BLOOD BY WESTERGREN METHOD: 7 MM/H (ref 0–30)
GLUCOSE SERPL-MCNC: 89 MG/DL (ref 74–99)
HCT VFR BLD AUTO: 40.1 % (ref 36–46)
HGB BLD-MCNC: 13.4 G/DL (ref 12–16)
IMM GRANULOCYTES # BLD AUTO: 0.01 X10*3/UL (ref 0–0.7)
IMM GRANULOCYTES NFR BLD AUTO: 0.3 % (ref 0–0.9)
INR PPP: 1.1 (ref 0.9–1.1)
LDH SERPL L TO P-CCNC: 204 U/L (ref 84–246)
LYMPHOCYTES # BLD AUTO: 2.05 X10*3/UL (ref 1.2–4.8)
LYMPHOCYTES NFR BLD AUTO: 57.1 %
MCH RBC QN AUTO: 28.5 PG (ref 26–34)
MCHC RBC AUTO-ENTMCNC: 33.4 G/DL (ref 32–36)
MCV RBC AUTO: 85 FL (ref 80–100)
MONOCYTES # BLD AUTO: 0.37 X10*3/UL (ref 0.1–1)
MONOCYTES NFR BLD AUTO: 10.3 %
NEUTROPHILS # BLD AUTO: 1.01 X10*3/UL (ref 1.2–7.7)
NEUTROPHILS NFR BLD AUTO: 28.2 %
NRBC BLD-RTO: 0 /100 WBCS (ref 0–0)
PLATELET # BLD AUTO: 65 X10*3/UL (ref 150–450)
POTASSIUM SERPL-SCNC: 3.2 MMOL/L (ref 3.5–5.3)
PROT SERPL-MCNC: 7.7 G/DL (ref 6.4–8.2)
PROTHROMBIN TIME: 12.5 SECONDS (ref 9.8–12.8)
RBC # BLD AUTO: 4.7 X10*6/UL (ref 4–5.2)
SODIUM SERPL-SCNC: 139 MMOL/L (ref 136–145)
WBC # BLD AUTO: 3.6 X10*3/UL (ref 4.4–11.3)

## 2024-10-22 PROCEDURE — 85610 PROTHROMBIN TIME: CPT

## 2024-10-22 PROCEDURE — 83615 LACTATE (LD) (LDH) ENZYME: CPT

## 2024-10-22 PROCEDURE — 3080F DIAST BP >= 90 MM HG: CPT | Performed by: INTERNAL MEDICINE

## 2024-10-22 PROCEDURE — 82746 ASSAY OF FOLIC ACID SERUM: CPT

## 2024-10-22 PROCEDURE — 85379 FIBRIN DEGRADATION QUANT: CPT

## 2024-10-22 PROCEDURE — 82607 VITAMIN B-12: CPT

## 2024-10-22 PROCEDURE — 3077F SYST BP >= 140 MM HG: CPT | Performed by: INTERNAL MEDICINE

## 2024-10-22 PROCEDURE — 85652 RBC SED RATE AUTOMATED: CPT

## 2024-10-22 PROCEDURE — 85025 COMPLETE CBC W/AUTO DIFF WBC: CPT

## 2024-10-22 PROCEDURE — 36415 COLL VENOUS BLD VENIPUNCTURE: CPT

## 2024-10-22 PROCEDURE — 85730 THROMBOPLASTIN TIME PARTIAL: CPT

## 2024-10-22 PROCEDURE — 99215 OFFICE O/P EST HI 40 MIN: CPT | Performed by: INTERNAL MEDICINE

## 2024-10-22 PROCEDURE — 80053 COMPREHEN METABOLIC PANEL: CPT

## 2024-10-22 ASSESSMENT — ENCOUNTER SYMPTOMS
OCCASIONAL FEELINGS OF UNSTEADINESS: 0
LOSS OF SENSATION IN FEET: 0
DEPRESSION: 0
RESPIRATORY NEGATIVE: 1
CONSTITUTIONAL NEGATIVE: 1
CARDIOVASCULAR NEGATIVE: 1
GASTROINTESTINAL NEGATIVE: 1

## 2024-10-22 ASSESSMENT — COLUMBIA-SUICIDE SEVERITY RATING SCALE - C-SSRS
1. IN THE PAST MONTH, HAVE YOU WISHED YOU WERE DEAD OR WISHED YOU COULD GO TO SLEEP AND NOT WAKE UP?: NO
2. HAVE YOU ACTUALLY HAD ANY THOUGHTS OF KILLING YOURSELF?: NO
6. HAVE YOU EVER DONE ANYTHING, STARTED TO DO ANYTHING, OR PREPARED TO DO ANYTHING TO END YOUR LIFE?: NO

## 2024-10-22 ASSESSMENT — PAIN SCALES - GENERAL: PAINLEVEL_OUTOF10: 0-NO PAIN

## 2024-10-22 NOTE — PROGRESS NOTES
Patient ID: Maryjane Wallis is a 53 y.o. female.    Subjective:  Returns for follow up for thrombocytopenia. Has had ITP 15 years ago taht was treated with steroids alone. Her Plt count fluctuated since then. It was decreasing in early 2024. A repeat CBC last week showed it to be at 40s => Hence being seen today.   Denies B symptoms, blood in urine or stool. No gum bleeding or ecchymoses.   No family h/o blood disorders. No new meds. Only abdominal surgery was hysterectomy years ago.     Assessment/Plan:  ? Thrombocytoepnia: She had had ITP in early 2010s, treated with steroids alone. Last week Plt was 44. Previously it was around 120s. She is asymptomatic. Will check CBC today again with vitamin indices and peripheral smear.   If Plt count is still at 40s, will need weekly CBCs for now. If it is WNL, will check it in a month. If <20K, drake tart her on steroids.     Review Of Systems:  Review of Systems   Constitutional: Negative.    HENT:  Negative.     Respiratory: Negative.     Cardiovascular: Negative.    Gastrointestinal: Negative.        Physical Exam:  BP (!) 141/97 (BP Location: Left arm, Patient Position: Sitting)   Pulse 74   Temp 36.1 °C (96.9 °F) (Temporal)   Resp 18   Wt 94.1 kg (207 lb 6.4 oz)   SpO2 97%   BMI 36.74 kg/m²   BSA: 2.05 meters squared  Performance Status: Asymptomatic  Physical Exam  Constitutional:       Appearance: Normal appearance.   HENT:      Head: Normocephalic and atraumatic.   Eyes:      Pupils: Pupils are equal, round, and reactive to light.   Cardiovascular:      Rate and Rhythm: Normal rate and regular rhythm.   Pulmonary:      Effort: Pulmonary effort is normal.   Abdominal:      General: Abdomen is flat.      Palpations: Abdomen is soft. There is no mass.   Musculoskeletal:      Right lower leg: No edema.      Left lower leg: No edema.   Lymphadenopathy:      Cervical: No cervical adenopathy.   Skin:     Coloration: Skin is not jaundiced.      Findings: No bruising.    Neurological:      General: No focal deficit present.      Mental Status: She is alert and oriented to person, place, and time.         Results:  Diagnostic Results   Lab Results   Component Value Date    WBC 2.9 (L) 10/17/2024    HGB 13.7 10/17/2024    HCT 41.7 10/17/2024    MCV 87 10/17/2024    PLT 44 (L) 10/17/2024     Lab Results   Component Value Date    CALCIUM 9.9 10/17/2024     10/17/2024    K 3.7 10/17/2024    CO2 26 10/17/2024     10/17/2024    BUN 11 10/17/2024    CREATININE 0.74 10/17/2024    ALT 8 10/17/2024    AST 13 10/17/2024       Current Outpatient Medications:     amLODIPine (Norvasc) 5 mg tablet, Take 1 tablet (5 mg) by mouth once daily., Disp: 90 tablet, Rfl: 0    atorvastatin (Lipitor) 40 mg tablet, Take 1 tablet (40 mg) by mouth once daily., Disp: 90 tablet, Rfl: 0    tirzepatide 10 mg/0.5 mL pen injector, Inject 10 mg under the skin every 7 days. Patient reported, unsure of dose, Disp: , Rfl:      Past Surgical History:   Procedure Laterality Date    BREAST BIOPSY Right     AXILLA BENIGN    CARPAL TUNNEL RELEASE Bilateral     Neuroplasty Decompression Median Nerve At Carpal Tunnel    HYSTERECTOMY  2013    TOTAL KNEE ARTHROPLASTY Right 2022    TUBAL LIGATION  2004     Family History   Problem Relation Name Age of Onset    Coronary artery disease Mother      Stroke Mother      Heart attack Mother      Hypertension Father      Thyroid disease Sister        reports that she has never smoked. She has never been exposed to tobacco smoke. She has never used smokeless tobacco.    Diagnoses and all orders for this visit:  Thrombocytopenia (CMS-HCC)  -     Referral to Hematology and Oncology  -     CBC and Auto Differential; Future  -     Comprehensive Metabolic Panel; Future  -     Sedimentation Rate; Future  -     Lactate Dehydrogenase; Future  -     Slide Request; Future  -     Vitamin B12; Future  -     Folate; Future  -     aPTT; Future  -     Protime-INR; Future  -     D-Dimer, Non  VTE; Future  -     Clinic Appointment Request; Future  -     CBC and Auto Differential; Future  -     CBC and Auto Differential; Standing       I spent more than 40 minutes for the patient today, including face-to-face conversation, pre-visit preparation, post-visit orders, and others.   Casepr Luong MD

## 2024-10-23 LAB
FOLATE SERPL-MCNC: 13.7 NG/ML
VIT B12 SERPL-MCNC: 703 PG/ML (ref 211–911)

## 2024-10-24 ENCOUNTER — TELEPHONE (OUTPATIENT)
Dept: PRIMARY CARE | Facility: CLINIC | Age: 54
End: 2024-10-24
Payer: COMMERCIAL

## 2024-10-25 ENCOUNTER — TELEPHONE (OUTPATIENT)
Dept: PRIMARY CARE | Facility: CLINIC | Age: 54
End: 2024-10-25
Payer: COMMERCIAL

## 2024-10-25 NOTE — TELEPHONE ENCOUNTER
PT REQUESTING PHYSICAL FORM BE EMAILED TO [email or web address] IT DID NOT GO TO THE OTHER EMAIL SHE PROVIDED

## 2024-10-29 ENCOUNTER — TELEPHONE (OUTPATIENT)
Dept: HEMATOLOGY/ONCOLOGY | Facility: HOSPITAL | Age: 54
End: 2024-10-29
Payer: COMMERCIAL

## 2024-10-30 DIAGNOSIS — Z96.651 STATUS POST RIGHT KNEE REPLACEMENT: ICD-10-CM

## 2024-10-30 LAB — NONINV COLON CA DNA+OCC BLD SCRN STL QL: NEGATIVE

## 2024-10-30 RX ORDER — AMOXICILLIN 500 MG/1
2000 TABLET, FILM COATED ORAL ONCE
Qty: 4 TABLET | Refills: 0 | Status: SHIPPED | OUTPATIENT
Start: 2024-10-30 | End: 2024-10-30

## 2024-10-31 ENCOUNTER — LAB (OUTPATIENT)
Dept: LAB | Facility: LAB | Age: 54
End: 2024-10-31
Payer: COMMERCIAL

## 2024-10-31 DIAGNOSIS — D69.6 THROMBOCYTOPENIA (CMS-HCC): ICD-10-CM

## 2024-10-31 LAB
BASOPHILS # BLD MANUAL: 0.03 X10*3/UL (ref 0–0.1)
BASOPHILS NFR BLD MANUAL: 0.9 %
BURR CELLS BLD QL SMEAR: ABNORMAL
EOSINOPHIL # BLD MANUAL: 0.19 X10*3/UL (ref 0–0.7)
EOSINOPHIL NFR BLD MANUAL: 5.2 %
ERYTHROCYTE [DISTWIDTH] IN BLOOD BY AUTOMATED COUNT: 13.5 % (ref 11.5–14.5)
HCT VFR BLD AUTO: 37.9 % (ref 36–46)
HGB BLD-MCNC: 12.2 G/DL (ref 12–16)
IMM GRANULOCYTES # BLD AUTO: 0.02 X10*3/UL (ref 0–0.7)
IMM GRANULOCYTES NFR BLD AUTO: 0.6 % (ref 0–0.9)
LYMPHOCYTES # BLD MANUAL: 2.53 X10*3/UL (ref 1.2–4.8)
LYMPHOCYTES NFR BLD MANUAL: 70.4 %
MCH RBC QN AUTO: 27.9 PG (ref 26–34)
MCHC RBC AUTO-ENTMCNC: 32.2 G/DL (ref 32–36)
MCV RBC AUTO: 87 FL (ref 80–100)
MONOCYTES # BLD MANUAL: 0.25 X10*3/UL (ref 0.1–1)
MONOCYTES NFR BLD MANUAL: 7 %
NEUTS SEG # BLD MANUAL: 0.56 X10*3/UL (ref 1.2–7)
NEUTS SEG NFR BLD MANUAL: 15.6 %
NRBC BLD-RTO: 0 /100 WBCS (ref 0–0)
PLATELET # BLD AUTO: 73 X10*3/UL (ref 150–450)
RBC # BLD AUTO: 4.37 X10*6/UL (ref 4–5.2)
RBC MORPH BLD: ABNORMAL
TOTAL CELLS COUNTED BLD: 115
VARIANT LYMPHS # BLD MANUAL: 0.03 X10*3/UL (ref 0–0.5)
VARIANT LYMPHS NFR BLD: 0.9 %
WBC # BLD AUTO: 3.6 X10*3/UL (ref 4.4–11.3)

## 2024-10-31 PROCEDURE — 85027 COMPLETE CBC AUTOMATED: CPT

## 2024-10-31 PROCEDURE — 85007 BL SMEAR W/DIFF WBC COUNT: CPT

## 2024-10-31 PROCEDURE — 36415 COLL VENOUS BLD VENIPUNCTURE: CPT

## 2024-11-07 ENCOUNTER — TELEPHONE (OUTPATIENT)
Dept: HEMATOLOGY/ONCOLOGY | Facility: HOSPITAL | Age: 54
End: 2024-11-07

## 2024-11-07 ENCOUNTER — LAB (OUTPATIENT)
Dept: LAB | Facility: LAB | Age: 54
End: 2024-11-07
Payer: COMMERCIAL

## 2024-11-07 DIAGNOSIS — D69.6 THROMBOCYTOPENIA (CMS-HCC): ICD-10-CM

## 2024-11-07 LAB
BASOPHILS # BLD AUTO: 0.03 X10*3/UL (ref 0–0.1)
BASOPHILS NFR BLD AUTO: 0.9 %
EOSINOPHIL # BLD AUTO: 0.18 X10*3/UL (ref 0–0.7)
EOSINOPHIL NFR BLD AUTO: 5.3 %
ERYTHROCYTE [DISTWIDTH] IN BLOOD BY AUTOMATED COUNT: 13.6 % (ref 11.5–14.5)
HCT VFR BLD AUTO: 38.9 % (ref 36–46)
HGB BLD-MCNC: 12.5 G/DL (ref 12–16)
IMM GRANULOCYTES # BLD AUTO: 0.01 X10*3/UL (ref 0–0.7)
IMM GRANULOCYTES NFR BLD AUTO: 0.3 % (ref 0–0.9)
LYMPHOCYTES # BLD AUTO: 1.94 X10*3/UL (ref 1.2–4.8)
LYMPHOCYTES NFR BLD AUTO: 57.4 %
MCH RBC QN AUTO: 28.2 PG (ref 26–34)
MCHC RBC AUTO-ENTMCNC: 32.1 G/DL (ref 32–36)
MCV RBC AUTO: 88 FL (ref 80–100)
MONOCYTES # BLD AUTO: 0.32 X10*3/UL (ref 0.1–1)
MONOCYTES NFR BLD AUTO: 9.5 %
NEUTROPHILS # BLD AUTO: 0.9 X10*3/UL (ref 1.2–7.7)
NEUTROPHILS NFR BLD AUTO: 26.6 %
NRBC BLD-RTO: 0 /100 WBCS (ref 0–0)
PLATELET # BLD AUTO: 32 X10*3/UL (ref 150–450)
RBC # BLD AUTO: 4.44 X10*6/UL (ref 4–5.2)
WBC # BLD AUTO: 3.4 X10*3/UL (ref 4.4–11.3)

## 2024-11-07 PROCEDURE — 36415 COLL VENOUS BLD VENIPUNCTURE: CPT

## 2024-11-07 PROCEDURE — 85060 BLOOD SMEAR INTERPRETATION: CPT | Performed by: INTERNAL MEDICINE

## 2024-11-07 PROCEDURE — 85025 COMPLETE CBC W/AUTO DIFF WBC: CPT

## 2024-11-07 NOTE — TELEPHONE ENCOUNTER
Can you ask her if she has any bleeding? Her Plt is at 30s. She should have a repeat CBC on Monday please. If still the same or lower, will start her on treatment.   Bushra - per Dr. DONNA Luong staff message.      KIZZY requesting patient return call to office regarding her lab work.        Contacted patient, states she has not noted any bleeding. Patient aware she needs to have a CBC drawn on Monday.

## 2024-11-07 NOTE — TELEPHONE ENCOUNTER
[facility] [place] lab called with critical lab result-Platelet count of 32. Dr. Casper Luong notified.

## 2024-11-08 LAB — PATH REVIEW-CBC DIFFERENTIAL: NORMAL

## 2024-11-11 ENCOUNTER — LAB (OUTPATIENT)
Dept: LAB | Facility: LAB | Age: 54
End: 2024-11-11
Payer: COMMERCIAL

## 2024-11-11 DIAGNOSIS — D69.6 THROMBOCYTOPENIA (CMS-HCC): ICD-10-CM

## 2024-11-11 PROCEDURE — 36415 COLL VENOUS BLD VENIPUNCTURE: CPT

## 2024-11-11 PROCEDURE — 85025 COMPLETE CBC W/AUTO DIFF WBC: CPT

## 2024-11-12 LAB
BASOPHILS # BLD AUTO: 0.04 X10*3/UL (ref 0–0.1)
BASOPHILS NFR BLD AUTO: 1 %
EOSINOPHIL # BLD AUTO: 0.14 X10*3/UL (ref 0–0.7)
EOSINOPHIL NFR BLD AUTO: 3.3 %
ERYTHROCYTE [DISTWIDTH] IN BLOOD BY AUTOMATED COUNT: 13.7 % (ref 11.5–14.5)
HCT VFR BLD AUTO: 40.2 % (ref 36–46)
HGB BLD-MCNC: 13.1 G/DL (ref 12–16)
IMM GRANULOCYTES # BLD AUTO: 0 X10*3/UL (ref 0–0.7)
IMM GRANULOCYTES NFR BLD AUTO: 0 % (ref 0–0.9)
LYMPHOCYTES # BLD AUTO: 2.24 X10*3/UL (ref 1.2–4.8)
LYMPHOCYTES NFR BLD AUTO: 53.3 %
MCH RBC QN AUTO: 28.1 PG (ref 26–34)
MCHC RBC AUTO-ENTMCNC: 32.6 G/DL (ref 32–36)
MCV RBC AUTO: 86 FL (ref 80–100)
MONOCYTES # BLD AUTO: 0.49 X10*3/UL (ref 0.1–1)
MONOCYTES NFR BLD AUTO: 11.7 %
NEUTROPHILS # BLD AUTO: 1.29 X10*3/UL (ref 1.2–7.7)
NEUTROPHILS NFR BLD AUTO: 30.7 %
NRBC BLD-RTO: 0 /100 WBCS (ref 0–0)
PLATELET # BLD AUTO: 47 X10*3/UL (ref 150–450)
RBC # BLD AUTO: 4.66 X10*6/UL (ref 4–5.2)
WBC # BLD AUTO: 4.2 X10*3/UL (ref 4.4–11.3)

## 2024-11-18 ENCOUNTER — LAB (OUTPATIENT)
Dept: LAB | Facility: LAB | Age: 54
End: 2024-11-18
Payer: COMMERCIAL

## 2024-11-18 DIAGNOSIS — D69.6 THROMBOCYTOPENIA (CMS-HCC): ICD-10-CM

## 2024-11-18 LAB
BASOPHILS # BLD AUTO: 0.04 X10*3/UL (ref 0–0.1)
BASOPHILS NFR BLD AUTO: 1.2 %
BURR CELLS BLD QL SMEAR: NORMAL
EOSINOPHIL # BLD AUTO: 0.22 X10*3/UL (ref 0–0.7)
EOSINOPHIL NFR BLD AUTO: 6.6 %
ERYTHROCYTE [DISTWIDTH] IN BLOOD BY AUTOMATED COUNT: 13.6 % (ref 11.5–14.5)
HCT VFR BLD AUTO: 38.4 % (ref 36–46)
HGB BLD-MCNC: 12.4 G/DL (ref 12–16)
IMM GRANULOCYTES # BLD AUTO: 0 X10*3/UL (ref 0–0.7)
IMM GRANULOCYTES NFR BLD AUTO: 0 % (ref 0–0.9)
LYMPHOCYTES # BLD AUTO: 2.12 X10*3/UL (ref 1.2–4.8)
LYMPHOCYTES NFR BLD AUTO: 64 %
MCH RBC QN AUTO: 28.2 PG (ref 26–34)
MCHC RBC AUTO-ENTMCNC: 32.3 G/DL (ref 32–36)
MCV RBC AUTO: 87 FL (ref 80–100)
MONOCYTES # BLD AUTO: 0.43 X10*3/UL (ref 0.1–1)
MONOCYTES NFR BLD AUTO: 13 %
NEUTROPHILS # BLD AUTO: 0.5 X10*3/UL (ref 1.2–7.7)
NEUTROPHILS NFR BLD AUTO: 15.2 %
NRBC BLD-RTO: 0 /100 WBCS (ref 0–0)
PLATELET # BLD AUTO: 45 X10*3/UL (ref 150–450)
RBC # BLD AUTO: 4.4 X10*6/UL (ref 4–5.2)
RBC MORPH BLD: NORMAL
WBC # BLD AUTO: 3.3 X10*3/UL (ref 4.4–11.3)

## 2024-11-18 PROCEDURE — 85025 COMPLETE CBC W/AUTO DIFF WBC: CPT

## 2024-11-18 PROCEDURE — 36415 COLL VENOUS BLD VENIPUNCTURE: CPT

## 2024-11-19 ENCOUNTER — OFFICE VISIT (OUTPATIENT)
Dept: HEMATOLOGY/ONCOLOGY | Facility: HOSPITAL | Age: 54
End: 2024-11-19
Payer: COMMERCIAL

## 2024-11-19 VITALS
SYSTOLIC BLOOD PRESSURE: 121 MMHG | TEMPERATURE: 96.7 F | RESPIRATION RATE: 18 BRPM | BODY MASS INDEX: 35.59 KG/M2 | OXYGEN SATURATION: 99 % | HEART RATE: 75 BPM | DIASTOLIC BLOOD PRESSURE: 84 MMHG | WEIGHT: 200.9 LBS

## 2024-11-19 DIAGNOSIS — D69.6 THROMBOCYTOPENIA (CMS-HCC): Primary | ICD-10-CM

## 2024-11-19 PROCEDURE — 3079F DIAST BP 80-89 MM HG: CPT | Performed by: INTERNAL MEDICINE

## 2024-11-19 PROCEDURE — 99215 OFFICE O/P EST HI 40 MIN: CPT | Performed by: INTERNAL MEDICINE

## 2024-11-19 PROCEDURE — 3074F SYST BP LT 130 MM HG: CPT | Performed by: INTERNAL MEDICINE

## 2024-11-19 RX ORDER — DEXAMETHASONE 4 MG/1
20 TABLET ORAL
Qty: 20 TABLET | Refills: 0 | Status: SHIPPED | OUTPATIENT
Start: 2024-11-19 | End: 2024-11-23

## 2024-11-19 ASSESSMENT — ENCOUNTER SYMPTOMS
RESPIRATORY NEGATIVE: 1
CARDIOVASCULAR NEGATIVE: 1
CONSTITUTIONAL NEGATIVE: 1
GASTROINTESTINAL NEGATIVE: 1

## 2024-11-19 ASSESSMENT — PAIN SCALES - GENERAL: PAINLEVEL_OUTOF10: 0-NO PAIN

## 2024-11-19 NOTE — PROGRESS NOTES
Patient ID: Maryjane Wallis is a 53 y.o. female.    Subjective:  I first saw her in Oct 2024 for thrombocytopenia. My note in Oct 2024 was as follows:  Returns for follow up for thrombocytopenia. Has had ITP 15 years ago taht was treated with steroids alone. Her Plt count fluctuated since then. It was decreasing in early 2024. A repeat CBC last week showed it to be at 40s => Hence being seen today.   Denies B symptoms, blood in urine or stool. No gum bleeding or ecchymoses.   No family h/o blood disorders. No new meds. Only abdominal surgery was hysterectomy years ago.     Today, she feels OK. Denies any bleeding. Plt count dropped to 30s then back up to 70s.     Assessment/Plan:  ? Thrombocytopenia: She had had ITP in early 2010s, treated with steroids alone. Plt count has been hovering around 100-150s but dropped to 40s in Sep 2024. We monitored it for the last month and dropped as low as 34.   Will give her short-course steroids: dexamethasone 20 mg daily x 4 days then repeat CBC to assess response. Will then monitor CBCs every other week;    Review Of Systems:  Review of Systems   Constitutional: Negative.    HENT:  Negative.     Respiratory: Negative.     Cardiovascular: Negative.    Gastrointestinal: Negative.        Physical Exam:  /84 (BP Location: Left arm, Patient Position: Sitting)   Pulse 75   Temp 35.9 °C (96.7 °F) (Temporal)   Resp 18   Wt 91.1 kg (200 lb 14.4 oz)   SpO2 99%   BMI 35.59 kg/m²   BSA: 2.01 meters squared  Performance Status: Asymptomatic  Physical Exam  Constitutional:       Appearance: Normal appearance.   HENT:      Head: Normocephalic and atraumatic.   Eyes:      Pupils: Pupils are equal, round, and reactive to light.   Cardiovascular:      Rate and Rhythm: Normal rate and regular rhythm.   Pulmonary:      Effort: Pulmonary effort is normal.   Abdominal:      General: Abdomen is flat.      Palpations: Abdomen is soft. There is no mass.   Musculoskeletal:      Right lower  leg: No edema.      Left lower leg: No edema.   Lymphadenopathy:      Cervical: No cervical adenopathy.   Skin:     Coloration: Skin is not jaundiced.      Findings: No bruising.   Neurological:      General: No focal deficit present.      Mental Status: She is alert and oriented to person, place, and time.         Results:  Diagnostic Results   Lab Results   Component Value Date    WBC 3.3 (L) 11/18/2024    HGB 12.4 11/18/2024    HCT 38.4 11/18/2024    MCV 87 11/18/2024    PLT 45 (L) 11/18/2024     Lab Results   Component Value Date    CALCIUM 10.1 10/22/2024     10/22/2024    K 3.2 (L) 10/22/2024    CO2 30 10/22/2024     10/22/2024    BUN 11 10/22/2024    CREATININE 0.67 10/22/2024    ALT 8 10/22/2024    AST 14 10/22/2024       Current Outpatient Medications:     amLODIPine (Norvasc) 5 mg tablet, Take 1 tablet (5 mg) by mouth once daily., Disp: 90 tablet, Rfl: 0    atorvastatin (Lipitor) 40 mg tablet, Take 1 tablet (40 mg) by mouth once daily., Disp: 90 tablet, Rfl: 0    tirzepatide 10 mg/0.5 mL pen injector, Inject 10 mg under the skin every 7 days. Patient reported, unsure of dose, Disp: , Rfl:     dexAMETHasone (Decadron) 4 mg tablet, Take 5 tablets (20 mg) by mouth once daily in the morning. Take before meals for 4 days., Disp: 20 tablet, Rfl: 0     Past Surgical History:   Procedure Laterality Date    BREAST BIOPSY Right     AXILLA BENIGN    CARPAL TUNNEL RELEASE Bilateral     Neuroplasty Decompression Median Nerve At Carpal Tunnel    HYSTERECTOMY  2013    TOTAL KNEE ARTHROPLASTY Right 2022    TUBAL LIGATION  2004     Family History   Problem Relation Name Age of Onset    Coronary artery disease Mother      Stroke Mother      Heart attack Mother      Hypertension Father      Thyroid disease Sister        reports that she has never smoked. She has never been exposed to tobacco smoke. She has never used smokeless tobacco.    Diagnoses and all orders for this visit:  Thrombocytopenia (CMS-HCC)  -      Clinic Appointment Request  -     dexAMETHasone (Decadron) 4 mg tablet; Take 5 tablets (20 mg) by mouth once daily in the morning. Take before meals for 4 days.  -     Clinic Appointment Request; Future  -     CBC and Auto Differential; Standing       Casper Luong MD

## 2024-11-25 ENCOUNTER — LAB (OUTPATIENT)
Dept: LAB | Facility: LAB | Age: 54
End: 2024-11-25
Payer: COMMERCIAL

## 2024-11-25 DIAGNOSIS — D69.6 THROMBOCYTOPENIA (CMS-HCC): ICD-10-CM

## 2024-11-25 LAB
BASOPHILS # BLD AUTO: 0.03 X10*3/UL (ref 0–0.1)
BASOPHILS NFR BLD AUTO: 0.5 %
BURR CELLS BLD QL SMEAR: NORMAL
EOSINOPHIL # BLD AUTO: 0.07 X10*3/UL (ref 0–0.7)
EOSINOPHIL NFR BLD AUTO: 1.1 %
ERYTHROCYTE [DISTWIDTH] IN BLOOD BY AUTOMATED COUNT: 13.5 % (ref 11.5–14.5)
HCT VFR BLD AUTO: 40.7 % (ref 36–46)
HGB BLD-MCNC: 13.6 G/DL (ref 12–16)
IMM GRANULOCYTES # BLD AUTO: 0.17 X10*3/UL (ref 0–0.7)
IMM GRANULOCYTES NFR BLD AUTO: 2.6 % (ref 0–0.9)
LYMPHOCYTES # BLD AUTO: 5.36 X10*3/UL (ref 1.2–4.8)
LYMPHOCYTES NFR BLD AUTO: 80.6 %
MCH RBC QN AUTO: 28.8 PG (ref 26–34)
MCHC RBC AUTO-ENTMCNC: 33.4 G/DL (ref 32–36)
MCV RBC AUTO: 86 FL (ref 80–100)
MONOCYTES # BLD AUTO: 0.56 X10*3/UL (ref 0.1–1)
MONOCYTES NFR BLD AUTO: 8.4 %
NEUTROPHILS # BLD AUTO: 0.46 X10*3/UL (ref 1.2–7.7)
NEUTROPHILS NFR BLD AUTO: 6.8 %
NRBC BLD-RTO: 0.5 /100 WBCS (ref 0–0)
OVALOCYTES BLD QL SMEAR: NORMAL
PLATELET # BLD AUTO: 176 X10*3/UL (ref 150–450)
RBC # BLD AUTO: 4.72 X10*6/UL (ref 4–5.2)
RBC MORPH BLD: NORMAL
WBC # BLD AUTO: 6.7 X10*3/UL (ref 4.4–11.3)

## 2024-11-25 PROCEDURE — 36415 COLL VENOUS BLD VENIPUNCTURE: CPT

## 2024-11-25 PROCEDURE — 85025 COMPLETE CBC W/AUTO DIFF WBC: CPT

## 2024-12-02 ENCOUNTER — LAB (OUTPATIENT)
Dept: LAB | Facility: LAB | Age: 54
End: 2024-12-02
Payer: COMMERCIAL

## 2024-12-02 DIAGNOSIS — D69.6 THROMBOCYTOPENIA (CMS-HCC): ICD-10-CM

## 2024-12-02 LAB
BASOPHILS # BLD AUTO: 0.02 X10*3/UL (ref 0–0.1)
BASOPHILS NFR BLD AUTO: 0.3 %
EOSINOPHIL # BLD AUTO: 0.1 X10*3/UL (ref 0–0.7)
EOSINOPHIL NFR BLD AUTO: 1.4 %
ERYTHROCYTE [DISTWIDTH] IN BLOOD BY AUTOMATED COUNT: 14 % (ref 11.5–14.5)
HCT VFR BLD AUTO: 39.6 % (ref 36–46)
HGB BLD-MCNC: 13 G/DL (ref 12–16)
IMM GRANULOCYTES # BLD AUTO: 0.03 X10*3/UL (ref 0–0.7)
IMM GRANULOCYTES NFR BLD AUTO: 0.4 % (ref 0–0.9)
LYMPHOCYTES # BLD AUTO: 2.33 X10*3/UL (ref 1.2–4.8)
LYMPHOCYTES NFR BLD AUTO: 31.7 %
MCH RBC QN AUTO: 29.1 PG (ref 26–34)
MCHC RBC AUTO-ENTMCNC: 32.8 G/DL (ref 32–36)
MCV RBC AUTO: 89 FL (ref 80–100)
MONOCYTES # BLD AUTO: 0.45 X10*3/UL (ref 0.1–1)
MONOCYTES NFR BLD AUTO: 6.1 %
NEUTROPHILS # BLD AUTO: 4.41 X10*3/UL (ref 1.2–7.7)
NEUTROPHILS NFR BLD AUTO: 60.1 %
NRBC BLD-RTO: 0 /100 WBCS (ref 0–0)
PLATELET # BLD AUTO: 91 X10*3/UL (ref 150–450)
RBC # BLD AUTO: 4.47 X10*6/UL (ref 4–5.2)
WBC # BLD AUTO: 7.3 X10*3/UL (ref 4.4–11.3)

## 2024-12-02 PROCEDURE — 85025 COMPLETE CBC W/AUTO DIFF WBC: CPT

## 2024-12-02 PROCEDURE — 36415 COLL VENOUS BLD VENIPUNCTURE: CPT

## 2024-12-09 ENCOUNTER — LAB (OUTPATIENT)
Dept: LAB | Facility: LAB | Age: 54
End: 2024-12-09
Payer: COMMERCIAL

## 2024-12-09 DIAGNOSIS — D69.6 THROMBOCYTOPENIA (CMS-HCC): ICD-10-CM

## 2024-12-09 LAB
BASOPHILS # BLD AUTO: 0.02 X10*3/UL (ref 0–0.1)
BASOPHILS NFR BLD AUTO: 0.5 %
EOSINOPHIL # BLD AUTO: 0.15 X10*3/UL (ref 0–0.7)
EOSINOPHIL NFR BLD AUTO: 3.5 %
ERYTHROCYTE [DISTWIDTH] IN BLOOD BY AUTOMATED COUNT: 14 % (ref 11.5–14.5)
HCT VFR BLD AUTO: 39.3 % (ref 36–46)
HGB BLD-MCNC: 12.7 G/DL (ref 12–16)
IMM GRANULOCYTES # BLD AUTO: 0.01 X10*3/UL (ref 0–0.7)
IMM GRANULOCYTES NFR BLD AUTO: 0.2 % (ref 0–0.9)
LYMPHOCYTES # BLD AUTO: 2.19 X10*3/UL (ref 1.2–4.8)
LYMPHOCYTES NFR BLD AUTO: 51.7 %
MCH RBC QN AUTO: 28.3 PG (ref 26–34)
MCHC RBC AUTO-ENTMCNC: 32.3 G/DL (ref 32–36)
MCV RBC AUTO: 88 FL (ref 80–100)
MONOCYTES # BLD AUTO: 0.37 X10*3/UL (ref 0.1–1)
MONOCYTES NFR BLD AUTO: 8.7 %
NEUTROPHILS # BLD AUTO: 1.5 X10*3/UL (ref 1.2–7.7)
NEUTROPHILS NFR BLD AUTO: 35.4 %
NRBC BLD-RTO: 0 /100 WBCS (ref 0–0)
PLATELET # BLD AUTO: 143 X10*3/UL (ref 150–450)
RBC # BLD AUTO: 4.48 X10*6/UL (ref 4–5.2)
WBC # BLD AUTO: 4.2 X10*3/UL (ref 4.4–11.3)

## 2024-12-09 PROCEDURE — 36415 COLL VENOUS BLD VENIPUNCTURE: CPT

## 2024-12-09 PROCEDURE — 85025 COMPLETE CBC W/AUTO DIFF WBC: CPT

## 2024-12-16 ENCOUNTER — LAB (OUTPATIENT)
Dept: LAB | Facility: LAB | Age: 54
End: 2024-12-16
Payer: COMMERCIAL

## 2024-12-16 DIAGNOSIS — D69.6 THROMBOCYTOPENIA (CMS-HCC): ICD-10-CM

## 2024-12-16 LAB
BASOPHILS # BLD AUTO: 0.03 X10*3/UL (ref 0–0.1)
BASOPHILS NFR BLD AUTO: 0.5 %
EOSINOPHIL # BLD AUTO: 0.15 X10*3/UL (ref 0–0.7)
EOSINOPHIL NFR BLD AUTO: 2.5 %
ERYTHROCYTE [DISTWIDTH] IN BLOOD BY AUTOMATED COUNT: 13.7 % (ref 11.5–14.5)
HCT VFR BLD AUTO: 38.8 % (ref 36–46)
HGB BLD-MCNC: 12.9 G/DL (ref 12–16)
IMM GRANULOCYTES # BLD AUTO: 0.02 X10*3/UL (ref 0–0.7)
IMM GRANULOCYTES NFR BLD AUTO: 0.3 % (ref 0–0.9)
LYMPHOCYTES # BLD AUTO: 1.82 X10*3/UL (ref 1.2–4.8)
LYMPHOCYTES NFR BLD AUTO: 29.8 %
MCH RBC QN AUTO: 29.1 PG (ref 26–34)
MCHC RBC AUTO-ENTMCNC: 33.2 G/DL (ref 32–36)
MCV RBC AUTO: 88 FL (ref 80–100)
MONOCYTES # BLD AUTO: 0.65 X10*3/UL (ref 0.1–1)
MONOCYTES NFR BLD AUTO: 10.6 %
NEUTROPHILS # BLD AUTO: 3.44 X10*3/UL (ref 1.2–7.7)
NEUTROPHILS NFR BLD AUTO: 56.3 %
NRBC BLD-RTO: 0 /100 WBCS (ref 0–0)
PLATELET # BLD AUTO: 233 X10*3/UL (ref 150–450)
RBC # BLD AUTO: 4.43 X10*6/UL (ref 4–5.2)
WBC # BLD AUTO: 6.1 X10*3/UL (ref 4.4–11.3)

## 2024-12-16 PROCEDURE — 36415 COLL VENOUS BLD VENIPUNCTURE: CPT

## 2024-12-16 PROCEDURE — 85025 COMPLETE CBC W/AUTO DIFF WBC: CPT

## 2024-12-17 ENCOUNTER — OFFICE VISIT (OUTPATIENT)
Dept: HEMATOLOGY/ONCOLOGY | Facility: HOSPITAL | Age: 54
End: 2024-12-17
Payer: COMMERCIAL

## 2024-12-17 VITALS
RESPIRATION RATE: 16 BRPM | WEIGHT: 197.2 LBS | DIASTOLIC BLOOD PRESSURE: 85 MMHG | HEART RATE: 80 BPM | BODY MASS INDEX: 34.93 KG/M2 | TEMPERATURE: 97 F | SYSTOLIC BLOOD PRESSURE: 119 MMHG | OXYGEN SATURATION: 95 %

## 2024-12-17 DIAGNOSIS — D69.6 THROMBOCYTOPENIA (CMS-HCC): Primary | ICD-10-CM

## 2024-12-17 PROCEDURE — 3079F DIAST BP 80-89 MM HG: CPT | Performed by: INTERNAL MEDICINE

## 2024-12-17 PROCEDURE — 99214 OFFICE O/P EST MOD 30 MIN: CPT | Performed by: INTERNAL MEDICINE

## 2024-12-17 PROCEDURE — 3074F SYST BP LT 130 MM HG: CPT | Performed by: INTERNAL MEDICINE

## 2024-12-17 ASSESSMENT — ENCOUNTER SYMPTOMS
GASTROINTESTINAL NEGATIVE: 1
CARDIOVASCULAR NEGATIVE: 1
CONSTITUTIONAL NEGATIVE: 1
RESPIRATORY NEGATIVE: 1

## 2024-12-17 ASSESSMENT — PAIN SCALES - GENERAL: PAINLEVEL_OUTOF10: 0-NO PAIN

## 2024-12-17 NOTE — PROGRESS NOTES
Patient ID: Maryjane Wallis is a 54 y.o. female.    Subjective:  I first saw her in Oct 2024 for thrombocytopenia. My note in Oct 2024 was as follows:  Returns for follow up for thrombocytopenia. Has had ITP 15 years ago taht was treated with steroids alone. Her Plt count fluctuated since then. It was decreasing in early 2024. A repeat CBC last week showed it to be at 40s => Hence being seen today.   Denies B symptoms, blood in urine or stool. No gum bleeding or ecchymoses.   No family h/o blood disorders. No new meds. Only abdominal surgery was hysterectomy years ago.     Returns for follow up for ITP. Feels OK. Plt count increased after 4 days of dexamethasone. No bleeding.     Assessment/Plan:  ? ITP: She had had ITP in early 2010s, treated with steroids alone. Plt count has been hovering around 100-150s but dropped to 40s in Sep 2024. We monitored it a month and it dropped to as low as 34.   Gave her dexamethasone 20 mg daily x 4 days in mid November => Increased to 200s.     Will continue monitoring CBC every 2 -4 weeks for the next 4 months. She will call us should she develop any significant bleeding.     Review Of Systems:  Review of Systems   Constitutional: Negative.    HENT:  Negative.     Respiratory: Negative.     Cardiovascular: Negative.    Gastrointestinal: Negative.        Physical Exam:  /85 (BP Location: Left arm, Patient Position: Sitting)   Pulse 80   Temp 36.1 °C (97 °F) (Temporal)   Resp 16   Wt 89.4 kg (197 lb 3.2 oz)   SpO2 95%   BMI 34.93 kg/m²   BSA: 1.99 meters squared  Performance Status: Asymptomatic  Physical Exam  Constitutional:       Appearance: Normal appearance.   HENT:      Head: Normocephalic and atraumatic.   Eyes:      Pupils: Pupils are equal, round, and reactive to light.   Cardiovascular:      Rate and Rhythm: Normal rate and regular rhythm.   Pulmonary:      Effort: Pulmonary effort is normal.   Abdominal:      General: Abdomen is flat.      Palpations: Abdomen  is soft. There is no mass.   Musculoskeletal:      Right lower leg: No edema.      Left lower leg: No edema.   Lymphadenopathy:      Cervical: No cervical adenopathy.   Skin:     Coloration: Skin is not jaundiced.      Findings: No bruising.   Neurological:      General: No focal deficit present.      Mental Status: She is alert and oriented to person, place, and time.         Results:  Diagnostic Results   Lab Results   Component Value Date    WBC 6.1 12/16/2024    HGB 12.9 12/16/2024    HCT 38.8 12/16/2024    MCV 88 12/16/2024     12/16/2024     Lab Results   Component Value Date    CALCIUM 10.1 10/22/2024     10/22/2024    K 3.2 (L) 10/22/2024    CO2 30 10/22/2024     10/22/2024    BUN 11 10/22/2024    CREATININE 0.67 10/22/2024    ALT 8 10/22/2024    AST 14 10/22/2024       Current Outpatient Medications:     amLODIPine (Norvasc) 5 mg tablet, Take 1 tablet (5 mg) by mouth once daily., Disp: 90 tablet, Rfl: 0    atorvastatin (Lipitor) 40 mg tablet, Take 1 tablet (40 mg) by mouth once daily., Disp: 90 tablet, Rfl: 0    tirzepatide 10 mg/0.5 mL pen injector, Inject 10 mg under the skin every 7 days. Patient reported, unsure of dose, Disp: , Rfl:     dexAMETHasone (Decadron) 4 mg tablet, Take 5 tablets (20 mg) by mouth once daily in the morning. Take before meals for 4 days., Disp: 20 tablet, Rfl: 0     Past Surgical History:   Procedure Laterality Date    BREAST BIOPSY Right     AXILLA BENIGN    CARPAL TUNNEL RELEASE Bilateral     Neuroplasty Decompression Median Nerve At Carpal Tunnel    HYSTERECTOMY  2013    TOTAL KNEE ARTHROPLASTY Right 2022    TUBAL LIGATION  2004     Family History   Problem Relation Name Age of Onset    Coronary artery disease Mother      Stroke Mother      Heart attack Mother      Hypertension Father      Thyroid disease Sister        reports that she has never smoked. She has never been exposed to tobacco smoke. She has never used smokeless tobacco.    Diagnoses and all  orders for this visit:  Thrombocytopenia (CMS-HCC)  -     Clinic Appointment Request  -     CBC and Auto Differential; Standing  -     Clinic Appointment Request; Future       Casper Luong MD                  No

## 2024-12-30 ENCOUNTER — LAB (OUTPATIENT)
Dept: LAB | Facility: LAB | Age: 54
End: 2024-12-30
Payer: COMMERCIAL

## 2024-12-30 DIAGNOSIS — D69.6 THROMBOCYTOPENIA (CMS-HCC): ICD-10-CM

## 2024-12-30 LAB
BASOPHILS # BLD AUTO: 0.04 X10*3/UL (ref 0–0.1)
BASOPHILS NFR BLD AUTO: 0.7 %
EOSINOPHIL # BLD AUTO: 0.11 X10*3/UL (ref 0–0.7)
EOSINOPHIL NFR BLD AUTO: 1.8 %
ERYTHROCYTE [DISTWIDTH] IN BLOOD BY AUTOMATED COUNT: 13.7 % (ref 11.5–14.5)
HCT VFR BLD AUTO: 39.7 % (ref 36–46)
HGB BLD-MCNC: 12.9 G/DL (ref 12–16)
IMM GRANULOCYTES # BLD AUTO: 0.02 X10*3/UL (ref 0–0.7)
IMM GRANULOCYTES NFR BLD AUTO: 0.3 % (ref 0–0.9)
LYMPHOCYTES # BLD AUTO: 2.29 X10*3/UL (ref 1.2–4.8)
LYMPHOCYTES NFR BLD AUTO: 37.7 %
MCH RBC QN AUTO: 28.3 PG (ref 26–34)
MCHC RBC AUTO-ENTMCNC: 32.5 G/DL (ref 32–36)
MCV RBC AUTO: 87 FL (ref 80–100)
MONOCYTES # BLD AUTO: 0.5 X10*3/UL (ref 0.1–1)
MONOCYTES NFR BLD AUTO: 8.2 %
NEUTROPHILS # BLD AUTO: 3.11 X10*3/UL (ref 1.2–7.7)
NEUTROPHILS NFR BLD AUTO: 51.3 %
NRBC BLD-RTO: 0 /100 WBCS (ref 0–0)
PLATELET # BLD AUTO: 177 X10*3/UL (ref 150–450)
RBC # BLD AUTO: 4.56 X10*6/UL (ref 4–5.2)
WBC # BLD AUTO: 6.1 X10*3/UL (ref 4.4–11.3)

## 2024-12-30 PROCEDURE — 85025 COMPLETE CBC W/AUTO DIFF WBC: CPT

## 2024-12-31 ENCOUNTER — OFFICE VISIT (OUTPATIENT)
Dept: ORTHOPEDIC SURGERY | Facility: HOSPITAL | Age: 54
End: 2024-12-31
Payer: COMMERCIAL

## 2024-12-31 ENCOUNTER — HOSPITAL ENCOUNTER (OUTPATIENT)
Dept: RADIOLOGY | Facility: HOSPITAL | Age: 54
Discharge: HOME | End: 2024-12-31
Payer: COMMERCIAL

## 2024-12-31 ENCOUNTER — HOSPITAL ENCOUNTER (OUTPATIENT)
Dept: RADIOLOGY | Facility: EXTERNAL LOCATION | Age: 54
Discharge: HOME | End: 2024-12-31

## 2024-12-31 DIAGNOSIS — M65.331 TRIGGER MIDDLE FINGER OF RIGHT HAND: Primary | ICD-10-CM

## 2024-12-31 DIAGNOSIS — M79.644 PAIN IN FINGER OF RIGHT HAND: ICD-10-CM

## 2024-12-31 PROCEDURE — 20550 NJX 1 TENDON SHEATH/LIGAMENT: CPT | Performed by: FAMILY MEDICINE

## 2024-12-31 PROCEDURE — 73130 X-RAY EXAM OF HAND: CPT | Mod: RT

## 2024-12-31 PROCEDURE — 99214 OFFICE O/P EST MOD 30 MIN: CPT | Performed by: FAMILY MEDICINE

## 2024-12-31 PROCEDURE — 73130 X-RAY EXAM OF HAND: CPT | Mod: RIGHT SIDE | Performed by: RADIOLOGY

## 2024-12-31 PROCEDURE — 99214 OFFICE O/P EST MOD 30 MIN: CPT | Mod: 25 | Performed by: FAMILY MEDICINE

## 2024-12-31 PROCEDURE — 76942 ECHO GUIDE FOR BIOPSY: CPT | Performed by: FAMILY MEDICINE

## 2024-12-31 PROCEDURE — 2500000004 HC RX 250 GENERAL PHARMACY W/ HCPCS (ALT 636 FOR OP/ED): Performed by: FAMILY MEDICINE

## 2024-12-31 RX ORDER — METHYLPREDNISOLONE ACETATE 40 MG/ML
40 INJECTION, SUSPENSION INTRA-ARTICULAR; INTRALESIONAL; INTRAMUSCULAR; SOFT TISSUE
Status: COMPLETED | OUTPATIENT
Start: 2024-12-31 | End: 2024-12-31

## 2024-12-31 RX ORDER — ROPIVACAINE HYDROCHLORIDE 5 MG/ML
0.5 INJECTION, SOLUTION EPIDURAL; INFILTRATION; PERINEURAL
Status: COMPLETED | OUTPATIENT
Start: 2024-12-31 | End: 2024-12-31

## 2024-12-31 RX ORDER — LIDOCAINE HYDROCHLORIDE 10 MG/ML
0.5 INJECTION, SOLUTION INFILTRATION; PERINEURAL
Status: COMPLETED | OUTPATIENT
Start: 2024-12-31 | End: 2024-12-31

## 2024-12-31 RX ADMIN — METHYLPREDNISOLONE ACETATE 40 MG: 40 INJECTION, SUSPENSION INTRALESIONAL; INTRAMUSCULAR; INTRASYNOVIAL; SOFT TISSUE at 11:12

## 2024-12-31 RX ADMIN — ROPIVACAINE HYDROCHLORIDE 0.5 ML: 5 INJECTION EPIDURAL; INFILTRATION; PERINEURAL at 11:12

## 2024-12-31 RX ADMIN — LIDOCAINE HYDROCHLORIDE 0.5 ML: 10 INJECTION, SOLUTION INFILTRATION; PERINEURAL at 11:12

## 2024-12-31 NOTE — PROGRESS NOTES
Patient ID: Maryjane Wallis is a 54 y.o. female.    Tendon Sheath Injection: R long A1 on 12/31/2024 11:12 AM  Indications: pain  Details: 21 G needle, ultrasound-guided volar approach  Medications: 0.5 mL lidocaine 10 mg/mL (1 %); 40 mg methylPREDNISolone acetate 40 mg/mL; 0.5 mL ropivacaine 5 mg/mL (0.5 %)  Procedure, treatment alternatives, risks and benefits explained, specific risks discussed. Consent was given by the patient. Immediately prior to procedure a time out was called to verify the correct patient, procedure, equipment, support staff and site/side marked as required. Patient was prepped and draped in the usual sterile fashion.       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    Today, 12/31/2024, patient presents with left third digit pain and discomfort over the last month.  The pain is worse throughout the day particular with handgrip and using her hand.  She feels like the middle of her hand starts to swell at the end of the day as well.  She also noticed that in the morning she felt like her middle finger gets stuck and she would have to forcefully extend it to get it straight, now she is having episodes where she will have to manually straighten it with her opposite hand.  She has a history of a right carpal tunnel and a left cubital tunnel surgery, denies any numbness tingling, denies any pain in the wrist or forearm.  Denies any specific falls or injuries.    She has no other complaints.    Physical Examination:     The right hand is without any obvious ecchymosis, swelling.  Full finger flexion and extension of her 2nd through 5th digits.  Normal thumb opposition, flexion, extension, abduction and adduction.  Tenderness to palpation over the A1 pulley of the third digit on the palmar aspect.  Strength and sensation full and intact. Negative Tinel's,  carpal tunnel compression test.  Negative Finkelstein's.    Opposite hand pain-free.    Imaging:  Radiographs of the hand right 3+ views were reviewed and revealed no acute fracture or dislocation, appropriate joint spaces.  The studies were reviewed by me personally in the office today.    Procedure:  After consent was obtained, the right hand was prepped in a sterile fashion. Ultrasound guidance was used to help insure proper needle placement into the tendon sheath, decrease patient discomfort, and decrease collateral damage. The tendon sheath was visualized and Depo-Medrol 40 mg with lidocaine 0.5 mL & ropivacaine 0.5 mL were injected without any complications. Ultrasound images were saved on an internal file for later reference. The patient tolerated the procedure well and the area was cleaned and bandaged.    Procedure Note Attestation   Procedure performed by my sports medicine fellow.    I, Dr. Honorio Sheriff MD, supervised the entire procedure .    Problem List Items Addressed This Visit    None  Visit Diagnoses         Codes    Trigger middle finger of right hand    -  Primary M65.331    Relevant Medications    ropivacaine (Naropin) 5 mg/mL (0.5 %) injection 0.5 mL (Completed)    lidocaine (Xylocaine) 10 mg/mL (1 %) injection 0.5 mL (Completed)    methylPREDNISolone acetate (DEPO-Medrol) injection 40 mg (Completed)    Other Relevant Orders    XR hand right 3+ views    Tendon Sheath Injection: R long A1 (Completed)    Point of Care Ultrasound (Completed)    Pain in finger of right hand     M79.644    Relevant Medications    ropivacaine (Naropin) 5 mg/mL (0.5 %) injection 0.5 mL (Completed)    lidocaine (Xylocaine) 10 mg/mL (1 %) injection 0.5 mL (Completed)    methylPREDNISolone acetate (DEPO-Medrol) injection 40 mg (Completed)    Other Relevant Orders    XR hand right 3+ views    Tendon Sheath Injection: R long A1 (Completed)    Point of Care Ultrasound (Completed)            Assessment and Plan:     We  reviewed the exam and x-ray findings and discussed the conservative and surgical treatment options. We agreed that patient's exam and history is most consistent with a trigger finger.  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  Patient tolerated this well.  We will have her follow-up with hand surgery if the injection does not work.    Cruzito Ashley, DO  [facility] Sports Medicine Fellow     **This note was dictated using Dragon speech recognition software and was not corrected for spelling or grammatical errors**.

## 2025-01-07 ENCOUNTER — APPOINTMENT (OUTPATIENT)
Dept: HEMATOLOGY/ONCOLOGY | Facility: HOSPITAL | Age: 55
End: 2025-01-07
Payer: COMMERCIAL

## 2025-01-13 ENCOUNTER — LAB (OUTPATIENT)
Dept: LAB | Facility: LAB | Age: 55
End: 2025-01-13
Payer: COMMERCIAL

## 2025-01-13 DIAGNOSIS — D69.6 THROMBOCYTOPENIA (CMS-HCC): ICD-10-CM

## 2025-01-13 LAB
BASOPHILS # BLD AUTO: 0.04 X10*3/UL (ref 0–0.1)
BASOPHILS NFR BLD AUTO: 0.6 %
EOSINOPHIL # BLD AUTO: 0.22 X10*3/UL (ref 0–0.7)
EOSINOPHIL NFR BLD AUTO: 3.6 %
ERYTHROCYTE [DISTWIDTH] IN BLOOD BY AUTOMATED COUNT: 13.9 % (ref 11.5–14.5)
HCT VFR BLD AUTO: 38.5 % (ref 36–46)
HGB BLD-MCNC: 12.6 G/DL (ref 12–16)
IMM GRANULOCYTES # BLD AUTO: 0.02 X10*3/UL (ref 0–0.7)
IMM GRANULOCYTES NFR BLD AUTO: 0.3 % (ref 0–0.9)
LYMPHOCYTES # BLD AUTO: 2.86 X10*3/UL (ref 1.2–4.8)
LYMPHOCYTES NFR BLD AUTO: 46.4 %
MCH RBC QN AUTO: 29.3 PG (ref 26–34)
MCHC RBC AUTO-ENTMCNC: 32.7 G/DL (ref 32–36)
MCV RBC AUTO: 90 FL (ref 80–100)
MONOCYTES # BLD AUTO: 0.47 X10*3/UL (ref 0.1–1)
MONOCYTES NFR BLD AUTO: 7.6 %
NEUTROPHILS # BLD AUTO: 2.56 X10*3/UL (ref 1.2–7.7)
NEUTROPHILS NFR BLD AUTO: 41.5 %
NRBC BLD-RTO: 0 /100 WBCS (ref 0–0)
PLATELET # BLD AUTO: 218 X10*3/UL (ref 150–450)
RBC # BLD AUTO: 4.3 X10*6/UL (ref 4–5.2)
WBC # BLD AUTO: 6.2 X10*3/UL (ref 4.4–11.3)

## 2025-01-13 PROCEDURE — 85025 COMPLETE CBC W/AUTO DIFF WBC: CPT

## 2025-01-27 ENCOUNTER — LAB (OUTPATIENT)
Dept: LAB | Facility: HOSPITAL | Age: 55
End: 2025-01-27
Payer: COMMERCIAL

## 2025-01-27 DIAGNOSIS — D69.6 THROMBOCYTOPENIA (CMS-HCC): ICD-10-CM

## 2025-01-27 LAB
BASOPHILS # BLD AUTO: 0.03 X10*3/UL (ref 0–0.1)
BASOPHILS NFR BLD AUTO: 0.6 %
EOSINOPHIL # BLD AUTO: 0.15 X10*3/UL (ref 0–0.7)
EOSINOPHIL NFR BLD AUTO: 3 %
ERYTHROCYTE [DISTWIDTH] IN BLOOD BY AUTOMATED COUNT: 14 % (ref 11.5–14.5)
HCT VFR BLD AUTO: 42 % (ref 36–46)
HGB BLD-MCNC: 13.4 G/DL (ref 12–16)
IMM GRANULOCYTES # BLD AUTO: 0.01 X10*3/UL (ref 0–0.7)
IMM GRANULOCYTES NFR BLD AUTO: 0.2 % (ref 0–0.9)
LYMPHOCYTES # BLD AUTO: 2.97 X10*3/UL (ref 1.2–4.8)
LYMPHOCYTES NFR BLD AUTO: 59.9 %
MCH RBC QN AUTO: 28.7 PG (ref 26–34)
MCHC RBC AUTO-ENTMCNC: 31.9 G/DL (ref 32–36)
MCV RBC AUTO: 90 FL (ref 80–100)
MONOCYTES # BLD AUTO: 0.39 X10*3/UL (ref 0.1–1)
MONOCYTES NFR BLD AUTO: 7.9 %
NEUTROPHILS # BLD AUTO: 1.41 X10*3/UL (ref 1.2–7.7)
NEUTROPHILS NFR BLD AUTO: 28.4 %
NRBC BLD-RTO: 0 /100 WBCS (ref 0–0)
PLATELET # BLD AUTO: 170 X10*3/UL (ref 150–450)
RBC # BLD AUTO: 4.67 X10*6/UL (ref 4–5.2)
WBC # BLD AUTO: 5 X10*3/UL (ref 4.4–11.3)

## 2025-01-27 PROCEDURE — 36415 COLL VENOUS BLD VENIPUNCTURE: CPT

## 2025-01-27 PROCEDURE — 85025 COMPLETE CBC W/AUTO DIFF WBC: CPT

## 2025-02-07 DIAGNOSIS — I10 ESSENTIAL (PRIMARY) HYPERTENSION: ICD-10-CM

## 2025-02-07 DIAGNOSIS — E78.5 HYPERLIPIDEMIA, UNSPECIFIED: ICD-10-CM

## 2025-02-07 RX ORDER — AMLODIPINE BESYLATE 5 MG/1
5 TABLET ORAL DAILY
Qty: 30 TABLET | Refills: 0 | Status: SHIPPED | OUTPATIENT
Start: 2025-02-07 | End: 2025-03-09

## 2025-02-07 RX ORDER — ATORVASTATIN CALCIUM 40 MG/1
40 TABLET, FILM COATED ORAL DAILY
Qty: 30 TABLET | Refills: 0 | Status: SHIPPED | OUTPATIENT
Start: 2025-02-07 | End: 2025-03-09

## 2025-02-12 ENCOUNTER — LAB (OUTPATIENT)
Dept: LAB | Facility: HOSPITAL | Age: 55
End: 2025-02-12
Payer: COMMERCIAL

## 2025-02-12 DIAGNOSIS — Z96.642 STATUS POST LEFT HIP REPLACEMENT: ICD-10-CM

## 2025-02-12 DIAGNOSIS — D69.6 THROMBOCYTOPENIA (CMS-HCC): ICD-10-CM

## 2025-02-12 DIAGNOSIS — D69.6 THROMBOCYTOPENIA, UNSPECIFIED (CMS-HCC): Primary | ICD-10-CM

## 2025-02-12 DIAGNOSIS — Z96.651 STATUS POST RIGHT KNEE REPLACEMENT: ICD-10-CM

## 2025-02-12 LAB
BASOPHILS # BLD AUTO: 0.03 X10*3/UL (ref 0–0.1)
BASOPHILS NFR BLD AUTO: 0.6 %
EOSINOPHIL # BLD AUTO: 0.16 X10*3/UL (ref 0–0.7)
EOSINOPHIL NFR BLD AUTO: 3.2 %
ERYTHROCYTE [DISTWIDTH] IN BLOOD BY AUTOMATED COUNT: 13.7 % (ref 11.5–14.5)
HCT VFR BLD AUTO: 38.8 % (ref 36–46)
HGB BLD-MCNC: 12.9 G/DL (ref 12–16)
IMM GRANULOCYTES # BLD AUTO: 0.01 X10*3/UL (ref 0–0.7)
IMM GRANULOCYTES NFR BLD AUTO: 0.2 % (ref 0–0.9)
LYMPHOCYTES # BLD AUTO: 2.62 X10*3/UL (ref 1.2–4.8)
LYMPHOCYTES NFR BLD AUTO: 52.5 %
MCH RBC QN AUTO: 28.9 PG (ref 26–34)
MCHC RBC AUTO-ENTMCNC: 33.2 G/DL (ref 32–36)
MCV RBC AUTO: 87 FL (ref 80–100)
MONOCYTES # BLD AUTO: 0.39 X10*3/UL (ref 0.1–1)
MONOCYTES NFR BLD AUTO: 7.8 %
NEUTROPHILS # BLD AUTO: 1.78 X10*3/UL (ref 1.2–7.7)
NEUTROPHILS NFR BLD AUTO: 35.7 %
NRBC BLD-RTO: 0 /100 WBCS (ref 0–0)
PLATELET # BLD AUTO: 222 X10*3/UL (ref 150–450)
RBC # BLD AUTO: 4.46 X10*6/UL (ref 4–5.2)
WBC # BLD AUTO: 5 X10*3/UL (ref 4.4–11.3)

## 2025-02-12 PROCEDURE — 85025 COMPLETE CBC W/AUTO DIFF WBC: CPT

## 2025-02-12 PROCEDURE — 36415 COLL VENOUS BLD VENIPUNCTURE: CPT

## 2025-02-12 RX ORDER — AMOXICILLIN 500 MG/1
2000 TABLET, FILM COATED ORAL ONCE
Qty: 4 TABLET | Refills: 0 | Status: SHIPPED | OUTPATIENT
Start: 2025-02-12 | End: 2025-02-12

## 2025-03-02 DIAGNOSIS — E78.5 HYPERLIPIDEMIA, UNSPECIFIED: ICD-10-CM

## 2025-03-02 DIAGNOSIS — I10 ESSENTIAL (PRIMARY) HYPERTENSION: ICD-10-CM

## 2025-03-03 ENCOUNTER — LAB (OUTPATIENT)
Dept: LAB | Facility: HOSPITAL | Age: 55
End: 2025-03-03
Payer: COMMERCIAL

## 2025-03-03 DIAGNOSIS — D69.6 THROMBOCYTOPENIA (CMS-HCC): ICD-10-CM

## 2025-03-03 DIAGNOSIS — D69.6 THROMBOCYTOPENIA, UNSPECIFIED (CMS-HCC): Primary | ICD-10-CM

## 2025-03-03 LAB
BASOPHILS # BLD AUTO: 0.03 X10*3/UL (ref 0–0.1)
BASOPHILS NFR BLD AUTO: 0.6 %
EOSINOPHIL # BLD AUTO: 0.15 X10*3/UL (ref 0–0.7)
EOSINOPHIL NFR BLD AUTO: 3.1 %
ERYTHROCYTE [DISTWIDTH] IN BLOOD BY AUTOMATED COUNT: 14 % (ref 11.5–14.5)
HCT VFR BLD AUTO: 40.6 % (ref 36–46)
HGB BLD-MCNC: 13.3 G/DL (ref 12–16)
IMM GRANULOCYTES # BLD AUTO: 0.02 X10*3/UL (ref 0–0.7)
IMM GRANULOCYTES NFR BLD AUTO: 0.4 % (ref 0–0.9)
LYMPHOCYTES # BLD AUTO: 2.6 X10*3/UL (ref 1.2–4.8)
LYMPHOCYTES NFR BLD AUTO: 53.6 %
MCH RBC QN AUTO: 29.3 PG (ref 26–34)
MCHC RBC AUTO-ENTMCNC: 32.8 G/DL (ref 32–36)
MCV RBC AUTO: 89 FL (ref 80–100)
MONOCYTES # BLD AUTO: 0.41 X10*3/UL (ref 0.1–1)
MONOCYTES NFR BLD AUTO: 8.5 %
NEUTROPHILS # BLD AUTO: 1.64 X10*3/UL (ref 1.2–7.7)
NEUTROPHILS NFR BLD AUTO: 33.8 %
NRBC BLD-RTO: 0 /100 WBCS (ref 0–0)
PLATELET # BLD AUTO: 210 X10*3/UL (ref 150–450)
RBC # BLD AUTO: 4.54 X10*6/UL (ref 4–5.2)
WBC # BLD AUTO: 4.9 X10*3/UL (ref 4.4–11.3)

## 2025-03-03 PROCEDURE — 85025 COMPLETE CBC W/AUTO DIFF WBC: CPT

## 2025-03-03 RX ORDER — ATORVASTATIN CALCIUM 40 MG/1
40 TABLET, FILM COATED ORAL DAILY
Qty: 90 TABLET | Refills: 1 | Status: SHIPPED | OUTPATIENT
Start: 2025-03-03

## 2025-03-03 RX ORDER — AMLODIPINE BESYLATE 5 MG/1
5 TABLET ORAL DAILY
Qty: 90 TABLET | Refills: 1 | Status: SHIPPED | OUTPATIENT
Start: 2025-03-03

## 2025-03-03 NOTE — TELEPHONE ENCOUNTER
Prescription request received and populated   Pharmacy populated  Last Office Visit: 10/16/24 for physical

## 2025-03-07 ENCOUNTER — TELEPHONE (OUTPATIENT)
Dept: HEMATOLOGY/ONCOLOGY | Facility: HOSPITAL | Age: 55
End: 2025-03-07
Payer: COMMERCIAL

## 2025-03-07 NOTE — TELEPHONE ENCOUNTER
Attempted to reach patient regarding her appointment with Dr. Luong in April. Left message for patient with call back number to call at her earliest convenience.

## 2025-03-24 ENCOUNTER — LAB (OUTPATIENT)
Dept: LAB | Facility: HOSPITAL | Age: 55
End: 2025-03-24
Payer: COMMERCIAL

## 2025-03-24 DIAGNOSIS — D69.6 THROMBOCYTOPENIA, UNSPECIFIED (CMS-HCC): Primary | ICD-10-CM

## 2025-03-24 DIAGNOSIS — D69.6 THROMBOCYTOPENIA (CMS-HCC): ICD-10-CM

## 2025-03-24 LAB
BASOPHILS # BLD AUTO: 0.03 X10*3/UL (ref 0–0.1)
BASOPHILS NFR BLD AUTO: 0.6 %
EOSINOPHIL # BLD AUTO: 0.11 X10*3/UL (ref 0–0.7)
EOSINOPHIL NFR BLD AUTO: 2.3 %
ERYTHROCYTE [DISTWIDTH] IN BLOOD BY AUTOMATED COUNT: 13.8 % (ref 11.5–14.5)
HCT VFR BLD AUTO: 40.5 % (ref 36–46)
HGB BLD-MCNC: 12.9 G/DL (ref 12–16)
IMM GRANULOCYTES # BLD AUTO: 0.03 X10*3/UL (ref 0–0.7)
IMM GRANULOCYTES NFR BLD AUTO: 0.6 % (ref 0–0.9)
LYMPHOCYTES # BLD AUTO: 2.09 X10*3/UL (ref 1.2–4.8)
LYMPHOCYTES NFR BLD AUTO: 43.8 %
MCH RBC QN AUTO: 28.7 PG (ref 26–34)
MCHC RBC AUTO-ENTMCNC: 31.9 G/DL (ref 32–36)
MCV RBC AUTO: 90 FL (ref 80–100)
MONOCYTES # BLD AUTO: 0.33 X10*3/UL (ref 0.1–1)
MONOCYTES NFR BLD AUTO: 6.9 %
NEUTROPHILS # BLD AUTO: 2.18 X10*3/UL (ref 1.2–7.7)
NEUTROPHILS NFR BLD AUTO: 45.8 %
NRBC BLD-RTO: 0 /100 WBCS (ref 0–0)
PLATELET # BLD AUTO: 230 X10*3/UL (ref 150–450)
RBC # BLD AUTO: 4.5 X10*6/UL (ref 4–5.2)
WBC # BLD AUTO: 4.8 X10*3/UL (ref 4.4–11.3)

## 2025-03-24 PROCEDURE — 85025 COMPLETE CBC W/AUTO DIFF WBC: CPT

## 2025-04-07 ENCOUNTER — LAB (OUTPATIENT)
Dept: LAB | Facility: HOSPITAL | Age: 55
End: 2025-04-07
Payer: COMMERCIAL

## 2025-04-07 DIAGNOSIS — D69.6 THROMBOCYTOPENIA (CMS-HCC): ICD-10-CM

## 2025-04-07 DIAGNOSIS — D69.6 THROMBOCYTOPENIA, UNSPECIFIED (CMS-HCC): Primary | ICD-10-CM

## 2025-04-07 LAB
BASOPHILS # BLD AUTO: 0.03 X10*3/UL (ref 0–0.1)
BASOPHILS NFR BLD AUTO: 0.6 %
EOSINOPHIL # BLD AUTO: 0.17 X10*3/UL (ref 0–0.7)
EOSINOPHIL NFR BLD AUTO: 3.2 %
ERYTHROCYTE [DISTWIDTH] IN BLOOD BY AUTOMATED COUNT: 13.5 % (ref 11.5–14.5)
HCT VFR BLD AUTO: 38.3 % (ref 36–46)
HGB BLD-MCNC: 12.7 G/DL (ref 12–16)
IMM GRANULOCYTES # BLD AUTO: 0.01 X10*3/UL (ref 0–0.7)
IMM GRANULOCYTES NFR BLD AUTO: 0.2 % (ref 0–0.9)
LYMPHOCYTES # BLD AUTO: 2.52 X10*3/UL (ref 1.2–4.8)
LYMPHOCYTES NFR BLD AUTO: 48 %
MCH RBC QN AUTO: 29.4 PG (ref 26–34)
MCHC RBC AUTO-ENTMCNC: 33.2 G/DL (ref 32–36)
MCV RBC AUTO: 89 FL (ref 80–100)
MONOCYTES # BLD AUTO: 0.36 X10*3/UL (ref 0.1–1)
MONOCYTES NFR BLD AUTO: 6.9 %
NEUTROPHILS # BLD AUTO: 2.16 X10*3/UL (ref 1.2–7.7)
NEUTROPHILS NFR BLD AUTO: 41.1 %
NRBC BLD-RTO: 0 /100 WBCS (ref 0–0)
PLATELET # BLD AUTO: 244 X10*3/UL (ref 150–450)
RBC # BLD AUTO: 4.32 X10*6/UL (ref 4–5.2)
WBC # BLD AUTO: 5.3 X10*3/UL (ref 4.4–11.3)

## 2025-04-07 PROCEDURE — 85025 COMPLETE CBC W/AUTO DIFF WBC: CPT

## 2025-04-21 ENCOUNTER — LAB (OUTPATIENT)
Dept: LAB | Facility: HOSPITAL | Age: 55
End: 2025-04-21
Payer: COMMERCIAL

## 2025-04-21 DIAGNOSIS — D69.6 THROMBOCYTOPENIA (CMS-HCC): ICD-10-CM

## 2025-04-21 DIAGNOSIS — D69.6 THROMBOCYTOPENIA, UNSPECIFIED (CMS-HCC): Primary | ICD-10-CM

## 2025-04-21 LAB
BASOPHILS # BLD AUTO: 0.04 X10*3/UL (ref 0–0.1)
BASOPHILS NFR BLD AUTO: 0.7 %
EOSINOPHIL # BLD AUTO: 0.15 X10*3/UL (ref 0–0.7)
EOSINOPHIL NFR BLD AUTO: 2.8 %
ERYTHROCYTE [DISTWIDTH] IN BLOOD BY AUTOMATED COUNT: 13.6 % (ref 11.5–14.5)
HCT VFR BLD AUTO: 39.5 % (ref 36–46)
HGB BLD-MCNC: 12.8 G/DL (ref 12–16)
IMM GRANULOCYTES # BLD AUTO: 0.02 X10*3/UL (ref 0–0.7)
IMM GRANULOCYTES NFR BLD AUTO: 0.4 % (ref 0–0.9)
LYMPHOCYTES # BLD AUTO: 2.61 X10*3/UL (ref 1.2–4.8)
LYMPHOCYTES NFR BLD AUTO: 48 %
MCH RBC QN AUTO: 28.3 PG (ref 26–34)
MCHC RBC AUTO-ENTMCNC: 32.4 G/DL (ref 32–36)
MCV RBC AUTO: 87 FL (ref 80–100)
MONOCYTES # BLD AUTO: 0.4 X10*3/UL (ref 0.1–1)
MONOCYTES NFR BLD AUTO: 7.4 %
NEUTROPHILS # BLD AUTO: 2.22 X10*3/UL (ref 1.2–7.7)
NEUTROPHILS NFR BLD AUTO: 40.7 %
NRBC BLD-RTO: 0 /100 WBCS (ref 0–0)
PLATELET # BLD AUTO: 207 X10*3/UL (ref 150–450)
RBC # BLD AUTO: 4.53 X10*6/UL (ref 4–5.2)
WBC # BLD AUTO: 5.4 X10*3/UL (ref 4.4–11.3)

## 2025-04-21 PROCEDURE — 85025 COMPLETE CBC W/AUTO DIFF WBC: CPT

## 2025-04-22 ENCOUNTER — APPOINTMENT (OUTPATIENT)
Dept: HEMATOLOGY/ONCOLOGY | Facility: HOSPITAL | Age: 55
End: 2025-04-22
Payer: COMMERCIAL

## 2025-04-29 ENCOUNTER — OFFICE VISIT (OUTPATIENT)
Dept: HEMATOLOGY/ONCOLOGY | Facility: HOSPITAL | Age: 55
End: 2025-04-29
Payer: COMMERCIAL

## 2025-04-29 VITALS
BODY MASS INDEX: 32.99 KG/M2 | DIASTOLIC BLOOD PRESSURE: 86 MMHG | TEMPERATURE: 95.9 F | HEART RATE: 77 BPM | RESPIRATION RATE: 16 BRPM | WEIGHT: 186.18 LBS | HEIGHT: 63 IN | SYSTOLIC BLOOD PRESSURE: 124 MMHG | OXYGEN SATURATION: 100 %

## 2025-04-29 DIAGNOSIS — D69.6 THROMBOCYTOPENIA (CMS-HCC): Primary | ICD-10-CM

## 2025-04-29 PROCEDURE — 99214 OFFICE O/P EST MOD 30 MIN: CPT | Performed by: INTERNAL MEDICINE

## 2025-04-29 PROCEDURE — 3079F DIAST BP 80-89 MM HG: CPT | Performed by: INTERNAL MEDICINE

## 2025-04-29 PROCEDURE — 3008F BODY MASS INDEX DOCD: CPT | Performed by: INTERNAL MEDICINE

## 2025-04-29 PROCEDURE — 3074F SYST BP LT 130 MM HG: CPT | Performed by: INTERNAL MEDICINE

## 2025-04-29 PROCEDURE — G2211 COMPLEX E/M VISIT ADD ON: HCPCS | Performed by: INTERNAL MEDICINE

## 2025-04-29 ASSESSMENT — LIFESTYLE VARIABLES
HOW OFTEN DO YOU HAVE A DRINK CONTAINING ALCOHOL: 2-4 TIMES A MONTH
AUDIT-C TOTAL SCORE: 2
HOW OFTEN DO YOU HAVE SIX OR MORE DRINKS ON ONE OCCASION: NEVER
SKIP TO QUESTIONS 9-10: 1
HOW MANY STANDARD DRINKS CONTAINING ALCOHOL DO YOU HAVE ON A TYPICAL DAY: 1 OR 2

## 2025-04-29 ASSESSMENT — ENCOUNTER SYMPTOMS
RESPIRATORY NEGATIVE: 1
CONSTITUTIONAL NEGATIVE: 1
CARDIOVASCULAR NEGATIVE: 1
GASTROINTESTINAL NEGATIVE: 1

## 2025-04-29 ASSESSMENT — PATIENT HEALTH QUESTIONNAIRE - PHQ9
1. LITTLE INTEREST OR PLEASURE IN DOING THINGS: NOT AT ALL
2. FEELING DOWN, DEPRESSED OR HOPELESS: NOT AT ALL
SUM OF ALL RESPONSES TO PHQ9 QUESTIONS 1 AND 2: 0

## 2025-04-29 ASSESSMENT — COLUMBIA-SUICIDE SEVERITY RATING SCALE - C-SSRS
6. HAVE YOU EVER DONE ANYTHING, STARTED TO DO ANYTHING, OR PREPARED TO DO ANYTHING TO END YOUR LIFE?: NO
2. HAVE YOU ACTUALLY HAD ANY THOUGHTS OF KILLING YOURSELF?: NO
1. IN THE PAST MONTH, HAVE YOU WISHED YOU WERE DEAD OR WISHED YOU COULD GO TO SLEEP AND NOT WAKE UP?: NO

## 2025-04-29 ASSESSMENT — PAIN SCALES - GENERAL: PAINLEVEL_OUTOF10: 0-NO PAIN

## 2025-04-29 NOTE — PROGRESS NOTES
"Patient ID: Maryjane Wallis is a 54 y.o. female.    Subjective:  I first saw her in Oct 2024 for thrombocytopenia. My note in Oct 2024 was as follows:  Returns for follow up for thrombocytopenia. Has had ITP 15 years ago taht was treated with steroids alone. Her Plt count fluctuated since then. It was decreasing in early 2024. A repeat CBC last week showed it to be at 40s => Hence being seen today.   Denies B symptoms, blood in urine or stool. No gum bleeding or ecchymoses.   No family h/o blood disorders. No new meds. Only abdominal surgery was hysterectomy years ago.     Returns for follow up for ITP. Feels OK.     Assessment/Plan:  ? ITP: She had had ITP in early 2010s, treated with steroids alone. Plt count has been hovering around 100-150s but dropped to 40s in Sep 2024. We monitored it a month and it dropped to as low as 34.   Gave her dexamethasone 20 mg daily x 4 days in mid November => Increased to 200s and remained there.    Her PLT remained above 150K in last 4 mos => Will check it annually from now on. Pt concurs.     Review Of Systems:  Review of Systems   Constitutional: Negative.    HENT:  Negative.     Respiratory: Negative.     Cardiovascular: Negative.    Gastrointestinal: Negative.        Physical Exam:  /86 (BP Location: Left arm, Patient Position: Sitting, BP Cuff Size: Adult)   Pulse 77   Temp 35.5 °C (95.9 °F) (Temporal)   Resp 16   Ht 1.588 m (5' 2.5\")   Wt 84.5 kg (186 lb 2.9 oz)   SpO2 100%   BMI 33.51 kg/m²   BSA: 1.93 meters squared  Performance Status: Asymptomatic  Physical Exam  Constitutional:       Appearance: Normal appearance.   HENT:      Head: Normocephalic and atraumatic.   Eyes:      Pupils: Pupils are equal, round, and reactive to light.   Cardiovascular:      Rate and Rhythm: Normal rate and regular rhythm.   Pulmonary:      Effort: Pulmonary effort is normal.   Abdominal:      General: Abdomen is flat.      Palpations: Abdomen is soft. There is no mass. "   Musculoskeletal:      Right lower leg: No edema.      Left lower leg: No edema.   Lymphadenopathy:      Cervical: No cervical adenopathy.   Skin:     Coloration: Skin is not jaundiced.      Findings: No bruising.   Neurological:      General: No focal deficit present.      Mental Status: She is alert and oriented to person, place, and time.         Results:  Diagnostic Results   Lab Results   Component Value Date    WBC 5.4 04/21/2025    HGB 12.8 04/21/2025    HCT 39.5 04/21/2025    MCV 87 04/21/2025     04/21/2025     Lab Results   Component Value Date    CALCIUM 10.1 10/22/2024     10/22/2024    K 3.2 (L) 10/22/2024    CO2 30 10/22/2024     10/22/2024    BUN 11 10/22/2024    CREATININE 0.67 10/22/2024    ALT 8 10/22/2024    AST 14 10/22/2024       Current Outpatient Medications:     amLODIPine (Norvasc) 5 mg tablet, TAKE 1 TABLET BY MOUTH EVERY DAY, Disp: 90 tablet, Rfl: 1    atorvastatin (Lipitor) 40 mg tablet, TAKE 1 TABLET BY MOUTH EVERY DAY, Disp: 90 tablet, Rfl: 1    tirzepatide 10 mg/0.5 mL pen injector, Inject 10 mg under the skin every 7 days. Patient reported, unsure of dose, Disp: , Rfl:     dexAMETHasone (Decadron) 4 mg tablet, Take 5 tablets (20 mg) by mouth once daily in the morning. Take before meals for 4 days., Disp: 20 tablet, Rfl: 0     Past Surgical History:   Procedure Laterality Date    BREAST BIOPSY Right     AXILLA BENIGN    CARPAL TUNNEL RELEASE Bilateral     Neuroplasty Decompression Median Nerve At Carpal Tunnel    HYSTERECTOMY  2013    TOTAL KNEE ARTHROPLASTY Right 2022    TUBAL LIGATION  2004     Family History   Problem Relation Name Age of Onset    Coronary artery disease Mother      Stroke Mother      Heart attack Mother      Hypertension Father      Thyroid disease Sister        reports that she has never smoked. She has never been exposed to tobacco smoke. She has never used smokeless tobacco.    Diagnoses and all orders for this visit:  Thrombocytopenia  (CMS-HCC)  -     Clinic Appointment Request       Casper Luong MD

## 2025-06-06 ENCOUNTER — OFFICE VISIT (OUTPATIENT)
Dept: URGENT CARE | Age: 55
End: 2025-06-06
Payer: COMMERCIAL

## 2025-06-06 VITALS
HEART RATE: 65 BPM | OXYGEN SATURATION: 98 % | RESPIRATION RATE: 20 BRPM | TEMPERATURE: 97.2 F | SYSTOLIC BLOOD PRESSURE: 142 MMHG | DIASTOLIC BLOOD PRESSURE: 79 MMHG

## 2025-06-06 DIAGNOSIS — B35.4 TINEA CORPORIS: Primary | ICD-10-CM

## 2025-06-06 RX ORDER — CLOTRIMAZOLE AND BETAMETHASONE DIPROPIONATE 10; .64 MG/G; MG/G
1 CREAM TOPICAL 2 TIMES DAILY
Qty: 15 G | Refills: 1 | Status: SHIPPED | OUTPATIENT
Start: 2025-06-06 | End: 2025-07-04

## 2025-06-06 NOTE — PROGRESS NOTES
Subjective   Patient ID: Maryjane Wallis is a 54 y.o. female. They present today with a chief complaint of Rash (All over. 5 days ago).    History of Present Illness  Patient is a pleasant 54 year old -American female, past medical history of hypertension, hyperlipidemia, presenting to the clinic for complaint of rash.  Patient is reporting a rash consisting of a lesion on her left arm, left anterior chest wall, and right lower back.  States they are very itchy.  States he thought it was ringworm has been providing topical clotrimazole cream to the areas with little relief.  States he has been doing this for 5 days.  She denies any other rashes.  No further complaints.  No numbness tingling or focal weakness.  No myalgias arthralgias generalized weakness or fatigue.  No chest pain or shortness of breath.      Rash        Past Medical History  Allergies as of 06/06/2025    (No Known Allergies)       Prescriptions Prior to Admission[1]       Medical History[2]    Surgical History[3]     reports that she has never smoked. She has never been exposed to tobacco smoke. She has never used smokeless tobacco. She reports current alcohol use of about 1.0 standard drink of alcohol per week. She reports that she does not use drugs.    Review of Systems  Review of Systems   Skin:  Positive for rash.                                  Objective    Vitals:    06/06/25 1532   BP: 142/79   Pulse: 65   Resp: 20   Temp: 36.2 °C (97.2 °F)   SpO2: 98%     No LMP recorded. Patient is perimenopausal.    Physical Exam  General: Vitals Noted. No distress. Normocephalic.     HEENT: TMs normal, EOMI, normal conjunctiva, patent nares, Normal OP    Neck: Supple with no adenopathy.     Cardiac: Regular Rate and Rhythm. No murmur.     Pulmonary: Equal breath sounds bilaterally. No wheezes, rhonchi, or rales.    Abdomen: Soft, non-tender, with normal bowel sounds.     Musculoskeletal: Moves all extremities, no effusion, no edema.     Skin:  There are approximately 1.5 cm annular lesions that are erythematous with central clearing over the left anterior bicep, left anterior chest just below the left breast, and at the right middle lower back.  There are some signs of excoriation however no breaks in skin.  No associated induration or warmth.  No purulent drainage.  Otherwise no obvious rashes.  Procedures    Point of Care Test & Imaging Results from this visit    Imaging  No results found.    Cardiology, Vascular, and Other Imaging  No other imaging results found for the past 2 days      Diagnostic study results (if any) were reviewed by Alvin Dover PA-C.    Assessment/Plan   Allergies, medications, history, and pertinent labs/EKGs/Imaging reviewed by Alvin Dover PA-C.     Medical Decision Making   Patient was seen about the clinic for complaint of rash.  On exam patient is nontoxic very well-appearing respite comfortably no acute distress.  Vital signs are stable, afebrile.  Chest is clear, heart is regular, belly soft and nontender.  Vaginal rash as above concerning for tinea cord ports.  Will provide Lorzone cream to be tried twice daily for the next 2 to 3 weeks until clearance of rash was instructions to apply the cream for an additional week after clearance of the rash.  Must follow close with the primary care physician the next week.  She was sent home at this time.  Reviewed my impression, plan, strict return report to ED precautions with the patient.  She expresses understanding and agreement with plan of care.    Orders and Diagnoses  Diagnoses and all orders for this visit:  Tinea corporis  -     clotrimazole-betamethasone (Lotrisone) cream; Apply 1 Application topically 2 times a day for 28 days. Continue application for 1 week after rash clears        Medical Admin Record      Follow Up Instructions  No follow-ups on file.    Patient disposition: Home    Electronically signed by Alvin Dover PA-C  3:45 PM            [1] (Not in a hospital admission)  [2]   Past Medical History:  Diagnosis Date    Acute idiopathic thrombocytopenic purpura (Multi) 2007    Carpal tunnel syndrome of left wrist     Hyperlipidemia     Hypertension     OA (osteoarthritis)     bilateral knees    Obesity     Prediabetes    [3]   Past Surgical History:  Procedure Laterality Date    BREAST BIOPSY Right     AXILLA BENIGN    CARPAL TUNNEL RELEASE Bilateral     Neuroplasty Decompression Median Nerve At Carpal Tunnel    HYSTERECTOMY  2013    TOTAL KNEE ARTHROPLASTY Right 2022    TUBAL LIGATION  2004

## 2025-08-23 DIAGNOSIS — E78.5 HYPERLIPIDEMIA, UNSPECIFIED: ICD-10-CM

## 2025-08-23 DIAGNOSIS — I10 ESSENTIAL (PRIMARY) HYPERTENSION: ICD-10-CM

## 2025-08-23 ASSESSMENT — PROMIS GLOBAL HEALTH SCALE
CARRYOUT_PHYSICAL_ACTIVITIES: COMPLETELY
EMOTIONAL_PROBLEMS: SOMETIMES
RATE_GENERAL_HEALTH: VERY GOOD
RATE_MENTAL_HEALTH: EXCELLENT
RATE_SOCIAL_SATISFACTION: EXCELLENT
RATE_AVERAGE_PAIN: 4
RATE_QUALITY_OF_LIFE: EXCELLENT
RATE_PHYSICAL_HEALTH: GOOD
CARRYOUT_SOCIAL_ACTIVITIES: EXCELLENT

## 2025-08-28 ENCOUNTER — APPOINTMENT (OUTPATIENT)
Dept: PRIMARY CARE | Facility: CLINIC | Age: 55
End: 2025-08-28
Payer: COMMERCIAL

## 2025-08-29 RX ORDER — ATORVASTATIN CALCIUM 40 MG/1
40 TABLET, FILM COATED ORAL DAILY
Qty: 30 TABLET | Refills: 0 | Status: SHIPPED | OUTPATIENT
Start: 2025-08-29 | End: 2025-09-28

## 2025-08-29 RX ORDER — AMLODIPINE BESYLATE 5 MG/1
5 TABLET ORAL DAILY
Qty: 30 TABLET | Refills: 0 | Status: SHIPPED | OUTPATIENT
Start: 2025-08-29 | End: 2025-09-28

## 2025-10-10 ENCOUNTER — APPOINTMENT (OUTPATIENT)
Dept: PRIMARY CARE | Facility: CLINIC | Age: 55
End: 2025-10-10
Payer: COMMERCIAL

## 2025-12-17 ENCOUNTER — APPOINTMENT (OUTPATIENT)
Dept: ORTHOPEDIC SURGERY | Facility: CLINIC | Age: 55
End: 2025-12-17
Payer: COMMERCIAL

## (undated) DEVICE — DRAPE, INCISE, ANTIMICROBIAL, IOBAN 2, LARGE, 17 X 23 IN, DISPOSABLE, STERILE

## (undated) DEVICE — DRESSING, MEPILEX BORDER, POST-OP AG, 4 X 10 IN

## (undated) DEVICE — CLOSURE SYSTEM, DERMABOND, PRINEO, 22CM, STERILE

## (undated) DEVICE — Device

## (undated) DEVICE — GLOVE, SURGICAL, PROTEXIS PI MICRO, 8.0, PF, LF

## (undated) DEVICE — DRAPE, PAD, PREP, W/ 9 IN CUFF, 24 X 41, LF, NS

## (undated) DEVICE — PILLOW, ABDUCTION, MEDIUM

## (undated) DEVICE — SUTURE, VICRYL, 0, 36 IN, CT-1, UNDYED

## (undated) DEVICE — HIGH FLOW TIP FOR INTERPULSE HANDPIECE SET

## (undated) DEVICE — SUTURE, VICRYL, 1, 24 IN, CTD, UNDYED

## (undated) DEVICE — GLOVE, SURGICAL, PROTEXIS PI W/NEU-THERA, 8.0, PF, LF

## (undated) DEVICE — SUTURE, MONOCRYL, 4-0, 18 IN, PS2, UNDYED

## (undated) DEVICE — BLADE, SAW, SAGITTAL, 25 X 74 X 0.89 MM, STERILE

## (undated) DEVICE — SHIELD, SPLASH, SOFT CONE, MEDIUM, F/STRYKER INTERPULSE SYSTEM

## (undated) DEVICE — INTERPULSE HANDPIECE SET W/ 10FT SUCTION TUBING